# Patient Record
Sex: FEMALE | Race: WHITE | NOT HISPANIC OR LATINO | Employment: OTHER | URBAN - METROPOLITAN AREA
[De-identification: names, ages, dates, MRNs, and addresses within clinical notes are randomized per-mention and may not be internally consistent; named-entity substitution may affect disease eponyms.]

---

## 2024-10-15 ENCOUNTER — HOSPITAL ENCOUNTER (OUTPATIENT)
Facility: HOSPITAL | Age: 89
Setting detail: OBSERVATION
Discharge: HOSPICE | End: 2024-10-17
Attending: STUDENT IN AN ORGANIZED HEALTH CARE EDUCATION/TRAINING PROGRAM | Admitting: INTERNAL MEDICINE
Payer: MEDICARE

## 2024-10-15 ENCOUNTER — APPOINTMENT (OUTPATIENT)
Dept: RADIOLOGY | Facility: HOSPITAL | Age: 89
End: 2024-10-15
Payer: MEDICARE

## 2024-10-15 ENCOUNTER — APPOINTMENT (OUTPATIENT)
Dept: CARDIOLOGY | Facility: HOSPITAL | Age: 89
End: 2024-10-15
Payer: MEDICARE

## 2024-10-15 DIAGNOSIS — G93.41 ACUTE METABOLIC ENCEPHALOPATHY: ICD-10-CM

## 2024-10-15 DIAGNOSIS — E86.0 DEHYDRATION: ICD-10-CM

## 2024-10-15 DIAGNOSIS — R53.1 GENERALIZED WEAKNESS: Primary | ICD-10-CM

## 2024-10-15 DIAGNOSIS — G93.40 ENCEPHALOPATHY, UNSPECIFIED TYPE: ICD-10-CM

## 2024-10-15 DIAGNOSIS — M25.511 ACUTE PAIN OF RIGHT SHOULDER: ICD-10-CM

## 2024-10-15 DIAGNOSIS — N30.90 CYSTITIS: ICD-10-CM

## 2024-10-15 LAB
ALBUMIN SERPL BCP-MCNC: 4 G/DL (ref 3.4–5)
ALP SERPL-CCNC: 63 U/L (ref 33–136)
ALT SERPL W P-5'-P-CCNC: 20 U/L (ref 7–45)
ANION GAP SERPL CALC-SCNC: 15 MMOL/L (ref 10–20)
APPEARANCE UR: ABNORMAL
APTT PPP: 30 SECONDS (ref 27–38)
AST SERPL W P-5'-P-CCNC: 37 U/L (ref 9–39)
BACTERIA #/AREA URNS AUTO: ABNORMAL /HPF
BASOPHILS # BLD AUTO: 0.02 X10*3/UL (ref 0–0.1)
BASOPHILS NFR BLD AUTO: 0.3 %
BILIRUB SERPL-MCNC: 0.8 MG/DL (ref 0–1.2)
BILIRUB UR STRIP.AUTO-MCNC: NEGATIVE MG/DL
BUN SERPL-MCNC: 24 MG/DL (ref 6–23)
CALCIUM SERPL-MCNC: 8.8 MG/DL (ref 8.6–10.3)
CARDIAC TROPONIN I PNL SERPL HS: 14 NG/L (ref 0–13)
CHLORIDE SERPL-SCNC: 103 MMOL/L (ref 98–107)
CO2 SERPL-SCNC: 27 MMOL/L (ref 21–32)
COLOR UR: COLORLESS
CREAT SERPL-MCNC: 0.84 MG/DL (ref 0.5–1.05)
EGFRCR SERPLBLD CKD-EPI 2021: 65 ML/MIN/1.73M*2
EOSINOPHIL # BLD AUTO: 0.13 X10*3/UL (ref 0–0.4)
EOSINOPHIL NFR BLD AUTO: 1.8 %
ERYTHROCYTE [DISTWIDTH] IN BLOOD BY AUTOMATED COUNT: 13.4 % (ref 11.5–14.5)
FLUAV RNA RESP QL NAA+PROBE: NOT DETECTED
FLUBV RNA RESP QL NAA+PROBE: NOT DETECTED
GLUCOSE SERPL-MCNC: 95 MG/DL (ref 74–99)
GLUCOSE UR STRIP.AUTO-MCNC: NORMAL MG/DL
HCT VFR BLD AUTO: 38.7 % (ref 36–46)
HGB BLD-MCNC: 12.5 G/DL (ref 12–16)
IMM GRANULOCYTES # BLD AUTO: 0.02 X10*3/UL (ref 0–0.5)
IMM GRANULOCYTES NFR BLD AUTO: 0.3 % (ref 0–0.9)
INR PPP: 1 (ref 0.9–1.1)
KETONES UR STRIP.AUTO-MCNC: ABNORMAL MG/DL
LACTATE SERPL-SCNC: 0.9 MMOL/L (ref 0.4–2)
LEUKOCYTE ESTERASE UR QL STRIP.AUTO: ABNORMAL
LYMPHOCYTES # BLD AUTO: 1.1 X10*3/UL (ref 0.8–3)
LYMPHOCYTES NFR BLD AUTO: 15.4 %
MAGNESIUM SERPL-MCNC: 2.19 MG/DL (ref 1.6–2.4)
MCH RBC QN AUTO: 32.1 PG (ref 26–34)
MCHC RBC AUTO-ENTMCNC: 32.3 G/DL (ref 32–36)
MCV RBC AUTO: 99 FL (ref 80–100)
MONOCYTES # BLD AUTO: 0.62 X10*3/UL (ref 0.05–0.8)
MONOCYTES NFR BLD AUTO: 8.7 %
NEUTROPHILS # BLD AUTO: 5.25 X10*3/UL (ref 1.6–5.5)
NEUTROPHILS NFR BLD AUTO: 73.5 %
NITRITE UR QL STRIP.AUTO: ABNORMAL
NRBC BLD-RTO: 0 /100 WBCS (ref 0–0)
PH UR STRIP.AUTO: 8 [PH]
PLATELET # BLD AUTO: 194 X10*3/UL (ref 150–450)
POTASSIUM SERPL-SCNC: 4 MMOL/L (ref 3.5–5.3)
PROT SERPL-MCNC: 6.8 G/DL (ref 6.4–8.2)
PROT UR STRIP.AUTO-MCNC: ABNORMAL MG/DL
PROTHROMBIN TIME: 11 SECONDS (ref 9.8–12.8)
RBC # BLD AUTO: 3.9 X10*6/UL (ref 4–5.2)
RBC # UR STRIP.AUTO: NEGATIVE /UL
RBC #/AREA URNS AUTO: ABNORMAL /HPF
SARS-COV-2 RNA RESP QL NAA+PROBE: NOT DETECTED
SODIUM SERPL-SCNC: 141 MMOL/L (ref 136–145)
SP GR UR STRIP.AUTO: 1.02
TRI-PHOS CRY #/AREA UR COMP ASSIST: ABNORMAL /HPF
UROBILINOGEN UR STRIP.AUTO-MCNC: NORMAL MG/DL
WBC # BLD AUTO: 7.1 X10*3/UL (ref 4.4–11.3)
WBC #/AREA URNS AUTO: ABNORMAL /HPF

## 2024-10-15 PROCEDURE — 80053 COMPREHEN METABOLIC PANEL: CPT | Performed by: NURSE PRACTITIONER

## 2024-10-15 PROCEDURE — 72125 CT NECK SPINE W/O DYE: CPT | Performed by: STUDENT IN AN ORGANIZED HEALTH CARE EDUCATION/TRAINING PROGRAM

## 2024-10-15 PROCEDURE — 72128 CT CHEST SPINE W/O DYE: CPT | Performed by: STUDENT IN AN ORGANIZED HEALTH CARE EDUCATION/TRAINING PROGRAM

## 2024-10-15 PROCEDURE — 70450 CT HEAD/BRAIN W/O DYE: CPT | Performed by: STUDENT IN AN ORGANIZED HEALTH CARE EDUCATION/TRAINING PROGRAM

## 2024-10-15 PROCEDURE — 72131 CT LUMBAR SPINE W/O DYE: CPT | Performed by: STUDENT IN AN ORGANIZED HEALTH CARE EDUCATION/TRAINING PROGRAM

## 2024-10-15 PROCEDURE — 83605 ASSAY OF LACTIC ACID: CPT | Performed by: NURSE PRACTITIONER

## 2024-10-15 PROCEDURE — 36415 COLL VENOUS BLD VENIPUNCTURE: CPT | Performed by: NURSE PRACTITIONER

## 2024-10-15 PROCEDURE — 99285 EMERGENCY DEPT VISIT HI MDM: CPT | Mod: 25

## 2024-10-15 PROCEDURE — 93005 ELECTROCARDIOGRAM TRACING: CPT

## 2024-10-15 PROCEDURE — 72128 CT CHEST SPINE W/O DYE: CPT | Mod: RCN

## 2024-10-15 PROCEDURE — 87086 URINE CULTURE/COLONY COUNT: CPT | Mod: GEALAB | Performed by: NURSE PRACTITIONER

## 2024-10-15 PROCEDURE — 87636 SARSCOV2 & INF A&B AMP PRB: CPT | Performed by: NURSE PRACTITIONER

## 2024-10-15 PROCEDURE — 74177 CT ABD & PELVIS W/CONTRAST: CPT | Performed by: STUDENT IN AN ORGANIZED HEALTH CARE EDUCATION/TRAINING PROGRAM

## 2024-10-15 PROCEDURE — 71260 CT THORAX DX C+: CPT

## 2024-10-15 PROCEDURE — 84484 ASSAY OF TROPONIN QUANT: CPT | Performed by: NURSE PRACTITIONER

## 2024-10-15 PROCEDURE — 85610 PROTHROMBIN TIME: CPT | Performed by: NURSE PRACTITIONER

## 2024-10-15 PROCEDURE — 71260 CT THORAX DX C+: CPT | Performed by: STUDENT IN AN ORGANIZED HEALTH CARE EDUCATION/TRAINING PROGRAM

## 2024-10-15 PROCEDURE — 81001 URINALYSIS AUTO W/SCOPE: CPT | Performed by: NURSE PRACTITIONER

## 2024-10-15 PROCEDURE — 2550000001 HC RX 255 CONTRASTS: Performed by: STUDENT IN AN ORGANIZED HEALTH CARE EDUCATION/TRAINING PROGRAM

## 2024-10-15 PROCEDURE — 73030 X-RAY EXAM OF SHOULDER: CPT | Mod: RIGHT SIDE | Performed by: RADIOLOGY

## 2024-10-15 PROCEDURE — 85025 COMPLETE CBC W/AUTO DIFF WBC: CPT | Performed by: NURSE PRACTITIONER

## 2024-10-15 PROCEDURE — 72125 CT NECK SPINE W/O DYE: CPT

## 2024-10-15 PROCEDURE — 72131 CT LUMBAR SPINE W/O DYE: CPT | Mod: RCN

## 2024-10-15 PROCEDURE — 71045 X-RAY EXAM CHEST 1 VIEW: CPT

## 2024-10-15 PROCEDURE — 96361 HYDRATE IV INFUSION ADD-ON: CPT

## 2024-10-15 PROCEDURE — 70450 CT HEAD/BRAIN W/O DYE: CPT

## 2024-10-15 PROCEDURE — 85730 THROMBOPLASTIN TIME PARTIAL: CPT | Performed by: NURSE PRACTITIONER

## 2024-10-15 PROCEDURE — 2500000004 HC RX 250 GENERAL PHARMACY W/ HCPCS (ALT 636 FOR OP/ED): Performed by: NURSE PRACTITIONER

## 2024-10-15 PROCEDURE — 96365 THER/PROPH/DIAG IV INF INIT: CPT

## 2024-10-15 PROCEDURE — 83735 ASSAY OF MAGNESIUM: CPT | Performed by: NURSE PRACTITIONER

## 2024-10-15 PROCEDURE — 73030 X-RAY EXAM OF SHOULDER: CPT | Mod: RT

## 2024-10-15 PROCEDURE — 71045 X-RAY EXAM CHEST 1 VIEW: CPT | Mod: FOREIGN READ | Performed by: RADIOLOGY

## 2024-10-15 RX ORDER — CEFTRIAXONE 1 G/50ML
1 INJECTION, SOLUTION INTRAVENOUS ONCE
Status: COMPLETED | OUTPATIENT
Start: 2024-10-15 | End: 2024-10-15

## 2024-10-15 RX ADMIN — CEFTRIAXONE SODIUM 1 G: 1 INJECTION, SOLUTION INTRAVENOUS at 20:33

## 2024-10-15 RX ADMIN — SODIUM CHLORIDE 500 ML: 9 INJECTION, SOLUTION INTRAVENOUS at 18:54

## 2024-10-15 RX ADMIN — IOHEXOL 75 ML: 350 INJECTION, SOLUTION INTRAVENOUS at 19:48

## 2024-10-15 ASSESSMENT — PAIN SCALES - GENERAL: PAINLEVEL_OUTOF10: 0 - NO PAIN

## 2024-10-15 ASSESSMENT — PAIN - FUNCTIONAL ASSESSMENT: PAIN_FUNCTIONAL_ASSESSMENT: 0-10

## 2024-10-15 NOTE — ED PROVIDER NOTES
[facility]  [facility]  ED  Encounter Note  Admit Date/RoomTime: 10/15/2024  6:04 PM  [facility]  NAME: Melida Greene  : 1931  MRN: [id number]     Chief Complaint:  Urinary Frequency (Per EMS PT family sts she has UTI symptoms, less active last couple days. PT has HX dementia A&Ox0  baseline )    HISTORY OF PRESENT ILLNESS        Melida Greene is a 89 y.o. female who presents to the ED for evaluation of GENERALIZED WEAKNESS. Patient arrived via 911 per family for possible uti symptoms and not eating or acting appropriately. Family called regular doctor office who recommended ED evaluation.     Patient is alert to self.    Family who arrived shortly after arrival reported that pt was not acting appropriately on Friday they called 911 but vss were okay and they put her to bed. She did not eat that day.    On Saturday, she was somewhat better and seems stable. Today, seemed more altered. She does have diaper type rash due to her incontinence. She does not seem like her  baseline. She is alert to self per family. Mentation worse at night. No falls or trauma. She was weak enough to day that family had to assist with ambulation.     ROS   Pertinent positives and negatives are stated within HPI, all other systems reviewed and are negative.    Past Medical History:  has no past medical history on file.    Surgical History:  has no past surgical history on file.    Social History:   unclear if hx of smoking in the past alcohol or drug use    Family History: family history is not on file.     Allergies: Patient has no known allergies.    PHYSICAL EXAM   Oxygen Saturation Interpretation: Normal.     Physical Exam  Constitutional/General: Alert and oriented x0, well appearing, non toxic  HEENT:  NC/NT. PERRLA.  Airway patent.  Neck: Supple, full ROM. No midline vertebral tenderness or crepitus.   Respiratory: Lung sounds clear to auscultation bilaterally. No wheezes, rhonchi or stridor.  Not in respiratory distress.  CV:  Regular rate. Regular rhythm. No murmurs or rubs. 2+ distal pulses.  GI:  Abdomen soft, non-tender, non-distended. +BS. No rebound, guarding, or rigidity. No pulsatile masses.  Musculoskeletal: Moves all extremities x 4. Warm and well perfused. Capillary refill <3 seconds  Integument: Skin warm and dry. Diaper rash in perinum  Neurologic: Alert .  Psychiatric: anxious    Lab / Imaging Results   (All laboratory and radiology results have been personally reviewed by myself)  Labs:  Results for orders placed or performed during the hospital encounter of 10/15/24   CBC and Auto Differential   Result Value Ref Range    WBC 7.1 4.4 - 11.3 x10*3/uL    nRBC 0.0 0.0 - 0.0 /100 WBCs    RBC 3.90 (L) 4.00 - 5.20 x10*6/uL    Hemoglobin 12.5 12.0 - 16.0 g/dL    Hematocrit 38.7 36.0 - 46.0 %    MCV 99 80 - 100 fL    MCH 32.1 26.0 - 34.0 pg    MCHC 32.3 32.0 - 36.0 g/dL    RDW 13.4 11.5 - 14.5 %    Platelets 194 150 - 450 x10*3/uL    Neutrophils % 73.5 40.0 - 80.0 %    Immature Granulocytes %, Automated 0.3 0.0 - 0.9 %    Lymphocytes % 15.4 13.0 - 44.0 %    Monocytes % 8.7 2.0 - 10.0 %    Eosinophils % 1.8 0.0 - 6.0 %    Basophils % 0.3 0.0 - 2.0 %    Neutrophils Absolute 5.25 1.60 - 5.50 x10*3/uL    Immature Granulocytes Absolute, Automated 0.02 0.00 - 0.50 x10*3/uL    Lymphocytes Absolute 1.10 0.80 - 3.00 x10*3/uL    Monocytes Absolute 0.62 0.05 - 0.80 x10*3/uL    Eosinophils Absolute 0.13 0.00 - 0.40 x10*3/uL    Basophils Absolute 0.02 0.00 - 0.10 x10*3/uL   Magnesium   Result Value Ref Range    Magnesium 2.19 1.60 - 2.40 mg/dL   Comprehensive metabolic panel   Result Value Ref Range    Glucose 95 74 - 99 mg/dL    Sodium 141 136 - 145 mmol/L    Potassium 4.0 3.5 - 5.3 mmol/L    Chloride 103 98 - 107 mmol/L    Bicarbonate 27 21 - 32 mmol/L    Anion Gap 15 10 - 20 mmol/L    Urea Nitrogen 24 (H) 6 - 23 mg/dL    Creatinine 0.84 0.50 - 1.05 mg/dL    eGFR 65 >60 mL/min/1.73m*2    Calcium 8.8 8.6 -  10.3 mg/dL    Albumin 4.0 3.4 - 5.0 g/dL    Alkaline Phosphatase 63 33 - 136 U/L    Total Protein 6.8 6.4 - 8.2 g/dL    AST 37 9 - 39 U/L    Bilirubin, Total 0.8 0.0 - 1.2 mg/dL    ALT 20 7 - 45 U/L   Lactate   Result Value Ref Range    Lactate 0.9 0.4 - 2.0 mmol/L   Protime-INR   Result Value Ref Range    Protime 11.0 9.8 - 12.8 seconds    INR 1.0 0.9 - 1.1   aPTT   Result Value Ref Range    aPTT 30 27 - 38 seconds   Troponin I, High Sensitivity   Result Value Ref Range    Troponin I, High Sensitivity 14 (H) 0 - 13 ng/L   Sars-CoV-2 PCR   Result Value Ref Range    Coronavirus 2019, PCR Not Detected Not Detected   Influenza A, and B PCR   Result Value Ref Range    Flu A Result Not Detected Not Detected    Flu B Result Not Detected Not Detected   Urinalysis with Reflex Culture and Microscopic   Result Value Ref Range    Color, Urine Colorless (N) Light-Yellow, Yellow, Dark-Yellow    Appearance, Urine Turbid (N) Clear    Specific Gravity, Urine 1.017 1.005 - 1.035    pH, Urine 8.0 5.0, 5.5, 6.0, 6.5, 7.0, 7.5, 8.0    Protein, Urine 30 (1+) (A) NEGATIVE, 10 (TRACE), 20 (TRACE) mg/dL    Glucose, Urine Normal Normal mg/dL    Blood, Urine NEGATIVE NEGATIVE    Ketones, Urine TRACE (A) NEGATIVE mg/dL    Bilirubin, Urine NEGATIVE NEGATIVE    Urobilinogen, Urine Normal Normal mg/dL    Nitrite, Urine 1+ (A) NEGATIVE    Leukocyte Esterase, Urine 75 Tacos/µL (A) NEGATIVE   Microscopic Only, Urine   Result Value Ref Range    WBC, Urine 6-10 (A) 1-5, NONE /HPF    RBC, Urine 3-5 NONE, 1-2, 3-5 /HPF    Bacteria, Urine 1+ (A) NONE SEEN /HPF    Triple Phosphate Crystals, Urine 1+ NONE, 1+ /HPF     Imaging:  All Radiology results interpreted by Radiologist unless otherwise noted.  CT head wo IV contrast   Final Result   No acute intracranial abnormality was identified.        MACRO:   None        Signed by: Olesya Vu 10/15/2024 8:25 PM   Dictation workstation:   [id number]      CT cervical spine wo IV contrast   Final Result   1.  No acute fracture or traumatic malalignment of the cervical spine.   2. Moderate multilevel discogenic degenerative changes of cervical   spine as described above.        MACRO:   None        Signed by: Olesya Vu 10/15/2024 8:32 PM   Dictation workstation:   [id number]      CT thoracic spine wo IV contrast   Final Result   CHEST   1.  Suggestion of age-indeterminate subtle nondisplaced fracture   along the lateral aspect of the left 7th rib. Recommend correlation   with point tenderness at this location.   2. Otherwise no acute traumatic abnormality in the chest.   3. Mild subpleural mixed reticular and ground-glass opacities in the   dependent region of bilateral lower lobes, more pronounced on the   left compared to the right. This is most likely favored to represent   atelectasis. However pulmonary contusion can also be considered in   the differential, especially given history of trauma.        ABDOMEN - PELVIS   1.  No acute traumatic abnormality in the abdomen and pelvis.   2. Suggestion of mild nodular contour of the liver, this could be   related to diaphragmatic indentations. However very subtle or early   hepatic cirrhosis can also be considered in the differential in   appropriate clinical setting.   3. Incidental note of few scattered hepatic hypodense lesions (3 in   number) likely favored to represent cysts.   4. Hyperdense material within the dependent region of the   gallbladder, likely favored to represent vicarious excretion of   contrast. Layering stones/sludge can also be considered in the   differential.        THORACIC AND LUMBAR SPINE   1. Extensive osseous demineralization throughout the thoracic and   lumbar spine. No definite CT evidence of acute fracture or traumatic   malalignment.   2. Moderate lumbar spondylosis as described above.        MACRO:   None        Signed by: Olesya Vu 10/15/2024 10:06 PM   Dictation workstation:   [id number]      CT lumbar spine wo IV  contrast   Final Result   CHEST   1.  Suggestion of age-indeterminate subtle nondisplaced fracture   along the lateral aspect of the left 7th rib. Recommend correlation   with point tenderness at this location.   2. Otherwise no acute traumatic abnormality in the chest.   3. Mild subpleural mixed reticular and ground-glass opacities in the   dependent region of bilateral lower lobes, more pronounced on the   left compared to the right. This is most likely favored to represent   atelectasis. However pulmonary contusion can also be considered in   the differential, especially given history of trauma.        ABDOMEN - PELVIS   1.  No acute traumatic abnormality in the abdomen and pelvis.   2. Suggestion of mild nodular contour of the liver, this could be   related to diaphragmatic indentations. However very subtle or early   hepatic cirrhosis can also be considered in the differential in   appropriate clinical setting.   3. Incidental note of few scattered hepatic hypodense lesions (3 in   number) likely favored to represent cysts.   4. Hyperdense material within the dependent region of the   gallbladder, likely favored to represent vicarious excretion of   contrast. Layering stones/sludge can also be considered in the   differential.        THORACIC AND LUMBAR SPINE   1. Extensive osseous demineralization throughout the thoracic and   lumbar spine. No definite CT evidence of acute fracture or traumatic   malalignment.   2. Moderate lumbar spondylosis as described above.        MACRO:   None        Signed by: Olesya Vu 10/15/2024 10:06 PM   Dictation workstation:   [id number]      CT chest abdomen pelvis w IV contrast   Final Result   CHEST   1.  Suggestion of age-indeterminate subtle nondisplaced fracture   along the lateral aspect of the left 7th rib. Recommend correlation   with point tenderness at this location.   2. Otherwise no acute traumatic abnormality in the chest.   3. Mild subpleural mixed reticular  and ground-glass opacities in the   dependent region of bilateral lower lobes, more pronounced on the   left compared to the right. This is most likely favored to represent   atelectasis. However pulmonary contusion can also be considered in   the differential, especially given history of trauma.        ABDOMEN - PELVIS   1.  No acute traumatic abnormality in the abdomen and pelvis.   2. Suggestion of mild nodular contour of the liver, this could be   related to diaphragmatic indentations. However very subtle or early   hepatic cirrhosis can also be considered in the differential in   appropriate clinical setting.   3. Incidental note of few scattered hepatic hypodense lesions (3 in   number) likely favored to represent cysts.   4. Hyperdense material within the dependent region of the   gallbladder, likely favored to represent vicarious excretion of   contrast. Layering stones/sludge can also be considered in the   differential.        THORACIC AND LUMBAR SPINE   1. Extensive osseous demineralization throughout the thoracic and   lumbar spine. No definite CT evidence of acute fracture or traumatic   malalignment.   2. Moderate lumbar spondylosis as described above.        MACRO:   None        Signed by: Olesya Vu 10/15/2024 10:06 PM   Dictation workstation:   [id number]      XR chest 1 view   Final Result   No acute cardiopulmonary disease.   Signed by Ge Zuniga MD      XR shoulder right 2+ views   Final Result   No acute bony abnormalities.   Signed by Ge Zuniga MD          ED Course / Medical Decision Making     Medications   sodium chloride 0.9 % bolus 500 mL (0 mL intravenous Stopped 10/15/24 2007)   iohexol (OMNIPaque) 350 mg iodine/mL solution 75 mL (75 mL intravenous Given 10/15/24 1948)   cefTRIAXone (Rocephin) 1 g in dextrose (iso) IV 50 mL (0 g intravenous Stopped 10/15/24 2103)     ED Course as of 10/15/24 2333   Tue Oct 15, 2024   1831 EKG rate 86 pr interval 150 ms qrs duration 76 ms  qt qtc 378/452 ms prt axes 8 -26 24 normal rate rhythm and axis, personally reviewed by me [PK]      ED Course User Index  [PK] Natalia Erazo, MELI-CNP         Diagnoses as of 10/15/24 2333   Generalized weakness   Dehydration   Acute pain of right shoulder   Encephalopathy, unspecified type   Cystitis     Re-examination:    Patient’s condition fair    MDM:       Melida Greene is a 89 y.o. female who presents to the ED for evaluation of GENERALIZED WEAKNESS. Patient arrived via 911 per family for possible uti symptoms and not eating or acting appropriately. Family called regular doctor office who recommended ED evaluation.     Patient is alert to self.    Family who arrived shortly after arrival reported that pt was not acting appropriately on Friday they called 911 but vss were okay and they put her to bed. She did not eat that day.    On Saturday, she was somewhat better and seems stable. Today, seemed more altered. She does have diaper type rash due to her incontinence. She does not seem like her  baseline. She is alert to self per family. Mentation worse at night. No falls or trauma. She was weak enough to day that family had to assist with ambulation.    ED course stable  + strong urine smell and incontinence of urine.  + nitrate bacterial. Hx of frequent uti but not sure when last one.  Metabolic encephalopathy - altered sensorium since Friday.   No trauma, falls per family.  Has had appetite wax and wain since Friday.  No known fever or chills.  Ct scan head neck chest abd pelvis stable, no acute findings.   Received  ml x one and Rocephin1 gram ivp.  Vss stable.  Admitted to hospitalist  Ddx: UTI metabolic encephalopathy     Plan of Care/Counseling:  I reviewed today's visit with the patient and daughter and son in law in addition to providing specific details for the plan of care and counseling regarding the diagnosis and prognosis.  Questions are answered at this time and are agreeable with  the plan.    ASSESSMENT     1. Generalized weakness    2. Dehydration    3. Acute pain of right shoulder    4. Encephalopathy, unspecified type    5. Cystitis      PLAN   Admit to hospitalist     New Medications     New Medications Ordered This Visit   Medications    sodium chloride 0.9 % bolus 500 mL    iohexol (OMNIPaque) 350 mg iodine/mL solution 75 mL    cefTRIAXone (Rocephin) 1 g in dextrose (iso) IV 50 mL     Order Specific Question:   Suspected Indication (Select all that apply)     Answer:   Urinary Tract Infection     Order Specific Question:   Type of Therapy     Answer:   Empiric     Order Specific Question:   Type of Urinary Tract Infection     Answer:   Uncomplicated     Electronically signed by TY Eaton     **This report was transcribed using voice recognition software. Every effort was made to ensure accuracy; however, inadvertent computerized transcription errors may be present.  END OF ED PROVIDER NOTE     TY Eaton  10/15/24 3293

## 2024-10-16 LAB
ALBUMIN SERPL BCP-MCNC: 3.7 G/DL (ref 3.4–5)
ALP SERPL-CCNC: 60 U/L (ref 33–136)
ALT SERPL W P-5'-P-CCNC: 19 U/L (ref 7–45)
AMPHETAMINES UR QL SCN: NORMAL
ANION GAP SERPL CALC-SCNC: 12 MMOL/L (ref 10–20)
AST SERPL W P-5'-P-CCNC: 40 U/L (ref 9–39)
ATRIAL RATE: 86 BPM
BARBITURATES UR QL SCN: NORMAL
BENZODIAZ UR QL SCN: NORMAL
BILIRUB SERPL-MCNC: 0.6 MG/DL (ref 0–1.2)
BUN SERPL-MCNC: 23 MG/DL (ref 6–23)
BZE UR QL SCN: NORMAL
CALCIUM SERPL-MCNC: 8.8 MG/DL (ref 8.6–10.3)
CANNABINOIDS UR QL SCN: NORMAL
CHLORIDE SERPL-SCNC: 105 MMOL/L (ref 98–107)
CO2 SERPL-SCNC: 28 MMOL/L (ref 21–32)
CREAT SERPL-MCNC: 0.92 MG/DL (ref 0.5–1.05)
EGFRCR SERPLBLD CKD-EPI 2021: 59 ML/MIN/1.73M*2
ERYTHROCYTE [DISTWIDTH] IN BLOOD BY AUTOMATED COUNT: 13.2 % (ref 11.5–14.5)
FENTANYL+NORFENTANYL UR QL SCN: NORMAL
FOLATE SERPL-MCNC: >24 NG/ML
GLUCOSE SERPL-MCNC: 84 MG/DL (ref 74–99)
HCT VFR BLD AUTO: 37.1 % (ref 36–46)
HGB BLD-MCNC: 11.9 G/DL (ref 12–16)
HOLD SPECIMEN: NORMAL
MAGNESIUM SERPL-MCNC: 2.15 MG/DL (ref 1.6–2.4)
MCH RBC QN AUTO: 31.9 PG (ref 26–34)
MCHC RBC AUTO-ENTMCNC: 32.1 G/DL (ref 32–36)
MCV RBC AUTO: 100 FL (ref 80–100)
METHADONE UR QL SCN: NORMAL
NRBC BLD-RTO: 0 /100 WBCS (ref 0–0)
OPIATES UR QL SCN: NORMAL
OXYCODONE+OXYMORPHONE UR QL SCN: NORMAL
P AXIS: 8 DEGREES
P OFFSET: 186 MS
P ONSET: 140 MS
PCP UR QL SCN: NORMAL
PHOSPHATE SERPL-MCNC: 3.7 MG/DL (ref 2.5–4.9)
PLATELET # BLD AUTO: 182 X10*3/UL (ref 150–450)
POTASSIUM SERPL-SCNC: 4 MMOL/L (ref 3.5–5.3)
PR INTERVAL: 150 MS
PROT SERPL-MCNC: 6.6 G/DL (ref 6.4–8.2)
Q ONSET: 215 MS
QRS COUNT: 14 BEATS
QRS DURATION: 76 MS
QT INTERVAL: 378 MS
QTC CALCULATION(BAZETT): 452 MS
QTC FREDERICIA: 426 MS
R AXIS: -26 DEGREES
RBC # BLD AUTO: 3.73 X10*6/UL (ref 4–5.2)
SODIUM SERPL-SCNC: 141 MMOL/L (ref 136–145)
T AXIS: 24 DEGREES
T OFFSET: 404 MS
TSH SERPL-ACNC: 1.05 MIU/L (ref 0.44–3.98)
VENTRICULAR RATE: 86 BPM
VIT B12 SERPL-MCNC: 1040 PG/ML (ref 211–911)
WBC # BLD AUTO: 8.1 X10*3/UL (ref 4.4–11.3)

## 2024-10-16 PROCEDURE — 99223 1ST HOSP IP/OBS HIGH 75: CPT

## 2024-10-16 PROCEDURE — G0378 HOSPITAL OBSERVATION PER HR: HCPCS

## 2024-10-16 PROCEDURE — 2500000004 HC RX 250 GENERAL PHARMACY W/ HCPCS (ALT 636 FOR OP/ED)

## 2024-10-16 PROCEDURE — 94760 N-INVAS EAR/PLS OXIMETRY 1: CPT

## 2024-10-16 PROCEDURE — 82746 ASSAY OF FOLIC ACID SERUM: CPT | Mod: GEALAB

## 2024-10-16 PROCEDURE — 97161 PT EVAL LOW COMPLEX 20 MIN: CPT | Mod: GP

## 2024-10-16 PROCEDURE — 99222 1ST HOSP IP/OBS MODERATE 55: CPT

## 2024-10-16 PROCEDURE — 96366 THER/PROPH/DIAG IV INF ADDON: CPT

## 2024-10-16 PROCEDURE — 97535 SELF CARE MNGMENT TRAINING: CPT | Mod: GO

## 2024-10-16 PROCEDURE — 97165 OT EVAL LOW COMPLEX 30 MIN: CPT | Mod: GO

## 2024-10-16 PROCEDURE — 85027 COMPLETE CBC AUTOMATED: CPT

## 2024-10-16 PROCEDURE — 2500000002 HC RX 250 W HCPCS SELF ADMINISTERED DRUGS (ALT 637 FOR MEDICARE OP, ALT 636 FOR OP/ED): Mod: MUE

## 2024-10-16 PROCEDURE — 84075 ASSAY ALKALINE PHOSPHATASE: CPT

## 2024-10-16 PROCEDURE — 80307 DRUG TEST PRSMV CHEM ANLYZR: CPT

## 2024-10-16 PROCEDURE — 84443 ASSAY THYROID STIM HORMONE: CPT | Mod: GEALAB

## 2024-10-16 PROCEDURE — 99497 ADVNCD CARE PLAN 30 MIN: CPT

## 2024-10-16 PROCEDURE — 36415 COLL VENOUS BLD VENIPUNCTURE: CPT

## 2024-10-16 PROCEDURE — 84100 ASSAY OF PHOSPHORUS: CPT

## 2024-10-16 PROCEDURE — 96372 THER/PROPH/DIAG INJ SC/IM: CPT

## 2024-10-16 PROCEDURE — 82607 VITAMIN B-12: CPT | Mod: GEALAB

## 2024-10-16 PROCEDURE — 83735 ASSAY OF MAGNESIUM: CPT

## 2024-10-16 RX ORDER — CEFTRIAXONE 1 G/50ML
1 INJECTION, SOLUTION INTRAVENOUS EVERY 24 HOURS
Status: DISCONTINUED | OUTPATIENT
Start: 2024-10-16 | End: 2024-10-17 | Stop reason: HOSPADM

## 2024-10-16 RX ORDER — HALOPERIDOL 5 MG/ML
5 INJECTION INTRAMUSCULAR EVERY 6 HOURS PRN
Status: DISCONTINUED | OUTPATIENT
Start: 2024-10-16 | End: 2024-10-17 | Stop reason: HOSPADM

## 2024-10-16 RX ORDER — POLYETHYLENE GLYCOL 3350 17 G/17G
17 POWDER, FOR SOLUTION ORAL DAILY
Status: DISCONTINUED | OUTPATIENT
Start: 2024-10-16 | End: 2024-10-17 | Stop reason: HOSPADM

## 2024-10-16 RX ORDER — QUETIAPINE FUMARATE 25 MG/1
25 TABLET, FILM COATED ORAL NIGHTLY PRN
Status: DISCONTINUED | OUTPATIENT
Start: 2024-10-16 | End: 2024-10-17 | Stop reason: HOSPADM

## 2024-10-16 RX ORDER — ENOXAPARIN SODIUM 100 MG/ML
30 INJECTION SUBCUTANEOUS EVERY 24 HOURS
Status: DISCONTINUED | OUTPATIENT
Start: 2024-10-16 | End: 2024-10-17 | Stop reason: HOSPADM

## 2024-10-16 RX ADMIN — ENOXAPARIN SODIUM 30 MG: 30 INJECTION SUBCUTANEOUS at 08:38

## 2024-10-16 RX ADMIN — CEFTRIAXONE SODIUM 1 G: 1 INJECTION, SOLUTION INTRAVENOUS at 20:14

## 2024-10-16 RX ADMIN — QUETIAPINE FUMARATE 25 MG: 25 TABLET ORAL at 03:52

## 2024-10-16 SDOH — SOCIAL STABILITY: SOCIAL INSECURITY
WITHIN THE LAST YEAR, HAVE YOU BEEN KICKED, HIT, SLAPPED, OR OTHERWISE PHYSICALLY HURT BY YOUR PARTNER OR EX-PARTNER?: PATIENT UNABLE TO ANSWER

## 2024-10-16 SDOH — SOCIAL STABILITY: SOCIAL INSECURITY: WERE YOU ABLE TO COMPLETE ALL THE BEHAVIORAL HEALTH SCREENINGS?: YES

## 2024-10-16 SDOH — SOCIAL STABILITY: SOCIAL INSECURITY: WITHIN THE LAST YEAR, HAVE YOU BEEN AFRAID OF YOUR PARTNER OR EX-PARTNER?: PATIENT UNABLE TO ANSWER

## 2024-10-16 SDOH — SOCIAL STABILITY: SOCIAL INSECURITY: HAVE YOU HAD THOUGHTS OF HARMING ANYONE ELSE?: NO

## 2024-10-16 SDOH — SOCIAL STABILITY: SOCIAL INSECURITY: DOES ANYONE TRY TO KEEP YOU FROM HAVING/CONTACTING OTHER FRIENDS OR DOING THINGS OUTSIDE YOUR HOME?: NO

## 2024-10-16 SDOH — SOCIAL STABILITY: SOCIAL INSECURITY
WITHIN THE LAST YEAR, HAVE YOU BEEN HUMILIATED OR EMOTIONALLY ABUSED IN OTHER WAYS BY YOUR PARTNER OR EX-PARTNER?: PATIENT UNABLE TO ANSWER

## 2024-10-16 SDOH — ECONOMIC STABILITY: INCOME INSECURITY
IN THE PAST 12 MONTHS HAS THE ELECTRIC, GAS, OIL, OR WATER COMPANY THREATENED TO SHUT OFF SERVICES IN YOUR HOME?: PATIENT UNABLE TO ANSWER

## 2024-10-16 SDOH — SOCIAL STABILITY: SOCIAL INSECURITY: DO YOU FEEL UNSAFE GOING BACK TO THE PLACE WHERE YOU ARE LIVING?: NO

## 2024-10-16 SDOH — ECONOMIC STABILITY: HOUSING INSECURITY
IN THE LAST 12 MONTHS, WAS THERE A TIME WHEN YOU WERE NOT ABLE TO PAY THE MORTGAGE OR RENT ON TIME?: PATIENT UNABLE TO ANSWER

## 2024-10-16 SDOH — ECONOMIC STABILITY: FOOD INSECURITY
WITHIN THE PAST 12 MONTHS, THE FOOD YOU BOUGHT JUST DIDN'T LAST AND YOU DIDN'T HAVE MONEY TO GET MORE.: PATIENT UNABLE TO ANSWER

## 2024-10-16 SDOH — ECONOMIC STABILITY: FOOD INSECURITY
WITHIN THE PAST 12 MONTHS, YOU WORRIED THAT YOUR FOOD WOULD RUN OUT BEFORE YOU GOT THE MONEY TO BUY MORE.: PATIENT UNABLE TO ANSWER

## 2024-10-16 SDOH — ECONOMIC STABILITY: HOUSING INSECURITY: IN THE PAST 12 MONTHS, HOW MANY TIMES HAVE YOU MOVED WHERE YOU WERE LIVING?: 0

## 2024-10-16 SDOH — ECONOMIC STABILITY: HOUSING INSECURITY: AT ANY TIME IN THE PAST 12 MONTHS, WERE YOU HOMELESS OR LIVING IN A SHELTER (INCLUDING NOW)?: PATIENT UNABLE TO ANSWER

## 2024-10-16 SDOH — ECONOMIC STABILITY: FOOD INSECURITY
HOW HARD IS IT FOR YOU TO PAY FOR THE VERY BASICS LIKE FOOD, HOUSING, MEDICAL CARE, AND HEATING?: PATIENT UNABLE TO ANSWER

## 2024-10-16 SDOH — ECONOMIC STABILITY: TRANSPORTATION INSECURITY
IN THE PAST 12 MONTHS, HAS LACK OF TRANSPORTATION KEPT YOU FROM MEDICAL APPOINTMENTS OR FROM GETTING MEDICATIONS?: PATIENT UNABLE TO ANSWER

## 2024-10-16 SDOH — SOCIAL STABILITY: SOCIAL INSECURITY: DO YOU FEEL ANYONE HAS EXPLOITED OR TAKEN ADVANTAGE OF YOU FINANCIALLY OR OF YOUR PERSONAL PROPERTY?: NO

## 2024-10-16 SDOH — SOCIAL STABILITY: SOCIAL INSECURITY
WITHIN THE LAST YEAR, HAVE YOU BEEN RAPED OR FORCED TO HAVE ANY KIND OF SEXUAL ACTIVITY BY YOUR PARTNER OR EX-PARTNER?: PATIENT UNABLE TO ANSWER

## 2024-10-16 SDOH — SOCIAL STABILITY: SOCIAL INSECURITY: ARE THERE ANY APPARENT SIGNS OF INJURIES/BEHAVIORS THAT COULD BE RELATED TO ABUSE/NEGLECT?: NO

## 2024-10-16 SDOH — SOCIAL STABILITY: SOCIAL INSECURITY: HAS ANYONE EVER THREATENED TO HURT YOUR FAMILY OR YOUR PETS?: NO

## 2024-10-16 SDOH — SOCIAL STABILITY: SOCIAL INSECURITY: ABUSE: ADULT

## 2024-10-16 SDOH — SOCIAL STABILITY: SOCIAL INSECURITY: HAVE YOU HAD ANY THOUGHTS OF HARMING ANYONE ELSE?: NO

## 2024-10-16 SDOH — SOCIAL STABILITY: SOCIAL INSECURITY: ARE YOU OR HAVE YOU BEEN THREATENED OR ABUSED PHYSICALLY, EMOTIONALLY, OR SEXUALLY BY ANYONE?: NO

## 2024-10-16 ASSESSMENT — PAIN SCALES - PAIN ASSESSMENT IN ADVANCED DEMENTIA (PAINAD)
BREATHING: NORMAL
CONSOLABILITY: DISTRACTED OR REASSURED BY VOICE/TOUCH
BREATHING: NORMAL
BODYLANGUAGE: TENSE, DISTRESSED PACING, FIDGETING
TOTALSCORE: 1
TOTALSCORE: 3
FACIALEXPRESSION: SMILING OR INEXPRESSIVE
NEGVOCALIZATION: OCCASIONAL MOAN/GROAN, LOW SPEECH, NEGATIVE/DISAPPROVING QUALITY
FACIALEXPRESSION: SMILING OR INEXPRESSIVE
BODYLANGUAGE: TENSE, DISTRESSED PACING, FIDGETING
TOTALSCORE: REPOSITIONED;THERAPEUTIC TOUCH;THERAPEUTIC PRESENCE
CONSOLABILITY: NO NEED TO CONSOLE

## 2024-10-16 ASSESSMENT — COGNITIVE AND FUNCTIONAL STATUS - GENERAL
MOBILITY SCORE: 16
MOVING FROM LYING ON BACK TO SITTING ON SIDE OF FLAT BED WITH BEDRAILS: A LITTLE
TURNING FROM BACK TO SIDE WHILE IN FLAT BAD: A LITTLE
DRESSING REGULAR UPPER BODY CLOTHING: A LOT
MOVING TO AND FROM BED TO CHAIR: A LOT
STANDING UP FROM CHAIR USING ARMS: A LITTLE
MOVING TO AND FROM BED TO CHAIR: A LITTLE
CLIMB 3 TO 5 STEPS WITH RAILING: A LOT
DAILY ACTIVITIY SCORE: 12
DAILY ACTIVITIY SCORE: 20
CLIMB 3 TO 5 STEPS WITH RAILING: TOTAL
HELP NEEDED FOR BATHING: A LITTLE
STANDING UP FROM CHAIR USING ARMS: A LOT
PERSONAL GROOMING: A LOT
TURNING FROM BACK TO SIDE WHILE IN FLAT BAD: A LITTLE
WALKING IN HOSPITAL ROOM: A LOT
HELP NEEDED FOR BATHING: A LOT
TOILETING: A LOT
DRESSING REGULAR UPPER BODY CLOTHING: A LITTLE
DRESSING REGULAR LOWER BODY CLOTHING: A LOT
MOBILITY SCORE: 14
TOILETING: A LITTLE
WALKING IN HOSPITAL ROOM: A LITTLE
DRESSING REGULAR LOWER BODY CLOTHING: A LITTLE
PATIENT BASELINE BEDBOUND: NO
MOVING FROM LYING ON BACK TO SITTING ON SIDE OF FLAT BED WITH BEDRAILS: A LITTLE
EATING MEALS: A LOT

## 2024-10-16 ASSESSMENT — PATIENT HEALTH QUESTIONNAIRE - PHQ9
1. LITTLE INTEREST OR PLEASURE IN DOING THINGS: NOT AT ALL
SUM OF ALL RESPONSES TO PHQ9 QUESTIONS 1 & 2: 0
2. FEELING DOWN, DEPRESSED OR HOPELESS: NOT AT ALL

## 2024-10-16 ASSESSMENT — ENCOUNTER SYMPTOMS
DIARRHEA: 0
VOMITING: 0
NAUSEA: 0
ACTIVITY CHANGE: 1
WHEEZING: 0
APPETITE CHANGE: 1
FREQUENCY: 1
SHORTNESS OF BREATH: 0

## 2024-10-16 ASSESSMENT — LIFESTYLE VARIABLES
HOW OFTEN DO YOU HAVE A DRINK CONTAINING ALCOHOL: NEVER
HOW OFTEN DO YOU HAVE 6 OR MORE DRINKS ON ONE OCCASION: NEVER
AUDIT-C TOTAL SCORE: 0
SKIP TO QUESTIONS 9-10: 1
AUDIT-C TOTAL SCORE: 0
HOW MANY STANDARD DRINKS CONTAINING ALCOHOL DO YOU HAVE ON A TYPICAL DAY: PATIENT DOES NOT DRINK

## 2024-10-16 ASSESSMENT — ACTIVITIES OF DAILY LIVING (ADL)
HEARING - RIGHT EAR: FUNCTIONAL
PATIENT'S MEMORY ADEQUATE TO SAFELY COMPLETE DAILY ACTIVITIES?: UNABLE TO ASSESS
HEARING - LEFT EAR: FUNCTIONAL
BATHING: NEEDS ASSISTANCE
HOME_MANAGEMENT_TIME_ENTRY: 20
LACK_OF_TRANSPORTATION: PATIENT UNABLE TO ANSWER
WALKS IN HOME: NEEDS ASSISTANCE
JUDGMENT_ADEQUATE_SAFELY_COMPLETE_DAILY_ACTIVITIES: UNABLE TO ASSESS
DRESSING YOURSELF: NEEDS ASSISTANCE
GROOMING: NEEDS ASSISTANCE
BATHING_ASSISTANCE: MAXIMAL
FEEDING YOURSELF: INDEPENDENT
ADL_ASSISTANCE: NEEDS ASSISTANCE
ADEQUATE_TO_COMPLETE_ADL: UNABLE TO ASSESS
TOILETING: NEEDS ASSISTANCE

## 2024-10-16 ASSESSMENT — PAIN - FUNCTIONAL ASSESSMENT
PAIN_FUNCTIONAL_ASSESSMENT: 0-10
PAIN_FUNCTIONAL_ASSESSMENT: 0-10
PAIN_FUNCTIONAL_ASSESSMENT: PAINAD (PAIN ASSESSMENT IN ADVANCED DEMENTIA SCALE)

## 2024-10-16 ASSESSMENT — PAIN SCALES - GENERAL
PAINLEVEL_OUTOF10: 0 - NO PAIN
PAINLEVEL_OUTOF10: 0 - NO PAIN

## 2024-10-16 ASSESSMENT — PAIN SCALES - WONG BAKER: WONGBAKER_NUMERICALRESPONSE: HURTS LITTLE MORE

## 2024-10-16 NOTE — PROGRESS NOTES
Occupational Therapy    Evaluation    Patient Name: Melida Greene  MRN: [id number]  Department: [address]  Room: 222222-A  Today's Date: 10/16/2024  Time Calculation  Start Time: 1400  Stop Time: 1430  Time Calculation (min): 30 min    Assessment  IP OT Assessment  OT Assessment: Pt presents with impaired ADL and functional mobility, although participation and tolerance are significantly impacted by her impaired cognition. Pt requires assistance and supervision to safely participate in all ADL and mobility. No skilled OT needs identified in this setting.  Barriers to Discharge: None  Evaluation/Treatment Tolerance: Treatment limited secondary to medical complications (Comment) (impaired cognition)  Medical Staff Made Aware: Yes  End of Session Communication: Bedside nurse, PCT/NA/CTA  End of Session Patient Position: Bed, 3 rail up, Alarm on  Plan:  No Skilled OT: No acute OT goals identified  OT Discharge Recommendations: 24 hr supervision due to cognition, No further acute OT  Equipment Recommended upon Discharge: Wheeled walker, Wheelchair (family owns)  OT Recommended Transfer Status: Assist of 1  OT - OK to Discharge: Yes (per OT POC)    Subjective     General:  General  Reason for Referral: 89 yo female admitted with generalized weakness, progressive weakness and functional decline over the last 6 months. Referred to OT for impaired ADL and safety assessment  Past Medical History Relevant to Rehab: Severe dementia with psychotic features  Family/Caregiver Present: No  Co-Treatment: PT  Co-Treatment Reason: to maximize pt outcomes  Prior to Session Communication: Bedside nurse  Patient Position Received: Bed, 3 rail up, Alarm on  General Comment: Pleasantly confused, responds accurately to < 50% commands, physical assistance required to complete desired tasks  Precautions:  Medical Precautions: Fall precautions (purewick)    Pain:  Pain Assessment  Rios-Pulido FACES Pain Rating: Hurts little more  Pain  "Type:  (NV pain response with some portions of mobility, however none verbalized)  Pain Interventions: Repositioned, Ambulation/increased activity, Food, Therapeutic touch  Response to Interventions: Positive response, able to participate in mobility as instructed and tolerated eating about 25% of meal prior to falling asleep    Objective   Cognition:  Overall Cognitive Status: Impaired at baseline  Orientation Level: Disoriented to place, Disoriented to time, Disoriented to situation (Responds to name, however does not provide verbal response to orientation questions)  Following Commands:  (Follows one step commands with physical prompts/directives and responds seemingly accurately with yes/no questions)           Home Living:  Lives With: Adult children  Home Living Comments: Pt unable to provide home set-up information due to impaired cognition, per EMR there are no concerns   Prior Function:  Receives Help From: Family  ADL Assistance: Needs assistance  Homemaking Assistance: Needs assistance  Ambulatory Assistance: Independent (ambulates within her home independently, intermittently requiring cane vs FWW vs WC depending on pt cognition and distance of ambulation)  Prior Function Comments: PLOF information obtained from EMR and TCC. Family is with pt at all times and provides care as needed    ADL:  Eating Assistance: Maximal  Eating Deficit: Setup, Increased time to complete, Bringing food to mouth assist (Soft food provided and other foods cut into small pieces. Pt unable to demo manipulation of utensils and hand to mouth stating \"That feels too hard\". Assistance provided, completing about 25% of meal)  Grooming Assistance: Maximal  Grooming Deficit: Setup, Verbal cueing, Supervision/safety, Increased time to complete, Standing with assistive device  Bathing Assistance: Maximal  Bathing Deficit: Setup, Verbal cueing, Supervision/safety, Increased time to complete   UE Dressing Assistance: Maximal  UE Dressing " Deficit: Setup, Verbal cueing, Supervision/safety, Thread RUE, Thread LUE, Pull over head, Pull around back  LE Dressing Assistance: Maximal  LE Dressing Deficit: Setup, Verbal cueing, Supervision/safety, Increased time to complete, Don/doff R sock, Don/doff L sock, Thread RLE into pants, Thread LLE into pants, Pull up over hips  Toileting Assistance with Device: Maximal  Toileting Deficit: Setup, Supervison/safety, Increased time to complete, Perineal hygiene, Incontinent  ADL Comments: Requires assistance due to impaired cognition, anticipate fluctation of level of assist required depending on the cognitive state at time of task presented  Activity Tolerance:  Endurance: Tolerates less than 10 min exercise, no significant change in vital signs  Bed Mobility/Transfers: Bed Mobility  Bed Mobility: Yes  Bed Mobility 1  Bed Mobility 1: Supine to sitting, Sitting to supine  Level of Assistance 1: Minimum assistance  Bed Mobility Comments 1: HOB elevated, HHA and prompts required to initiate and carryout movement to EOB    Transfers  Transfer: Yes  Transfer 1  Transfer From 1: Sit to, Stand to  Transfer to 1: Sit, Stand  Technique 1: Sit to stand, Stand to sit  Transfer Device 1: Walker  Transfer Level of Assistance 1: Minimum assistance  Trials/Comments 1: visual/tactile cues      Functional Mobility:  Functional Mobility  Functional Mobility Performed: Yes  Functional Mobility 1  Surface 1: Level tile  Device 1: Rolling walker  Assistance 1: Minimum assistance, Maximum tactile cues  Comments 1: Positioning of hands on FWW and assistance for management of device as a directional cue to continue with functional mobility demonstration. Pt ambulates around the foot of the bed, about 10-12'    Outcome Measures: [facility] Daily Activity  Putting on and taking off regular lower body clothing: A lot  Bathing (including washing, rinsing, drying): A lot  Putting on and taking off regular upper body clothing: A lot  Toileting, which  includes using toilet, bedpan or urinal: A lot  Taking care of personal grooming such as brushing teeth: A lot  Eating Meals: A lot  Daily Activity - Total Score: 12

## 2024-10-16 NOTE — PROGRESS NOTES
Physical Therapy    Physical Therapy Evaluation    Patient Name: Melida Greene  MRN: [id number]  Department: [address]  Room: 222/222-A  Today's Date: 10/16/2024   Time Calculation  Start Time: 1359  Stop Time: 1416  Time Calculation (min): 17 min    Assessment/Plan   PT Assessment  PT Assessment Results: Decreased mobility, Decreased strength, Decreased endurance, Impaired balance, Decreased cognition, Impaired judgement, Decreased safety awareness  Rehab Prognosis: Poor  Barriers to Discharge: Cognitive deficits  Evaluation/Treatment Tolerance: Patient tolerated treatment well  Medical Staff Made Aware: Yes  End of Session Communication: Bedside nurse  End of Session Patient Position: Bed, 3 rail up, Alarm on (call bell on lap)    Assessment:   Pt is a 89 yo female with PMH severe dementia who presents for PT eval this date. Pt is A&O x0-1, which is reportedly her baseline. Pt completed all functional mobility with Shayy and FWW, requiring max verbal/visual/tactile cues to follow instructions and assist to manage device. Pt appears to be functioning near her baseline. No acute PT needs at this time; will discontinue order. Recommend 24/7 supervision from family at discharge to maximize pt safety.       IP OR SWING BED PT PLAN  Inpatient or Swing Bed: Inpatient  PT Plan  PT Plan: PT Eval only  PT Eval Only Reason: At baseline function  PT Frequency: PT eval only  PT Discharge Recommendations: No further acute PT, No PT needed after discharge, 24 hr supervision due to cognition  Equipment Recommended upon Discharge: Wheeled walker, Wheelchair (family owns both)  PT Recommended Transfer Status: Assist x1  PT - OK to Discharge: Yes (per PT POC)    Subjective   General Visit Information:  General  Reason for Referral: Pt is a 89 yo female who was brought in by family on 10/15/24 with generalized weakness. Family reported pt has been increasingly immobile since Saturday. Pt has dementia with 6 month history of  worsening sx. PT consulted for decreased safety awareness and impaired mobility.  Past Medical History Relevant to Rehab: Severe dementia with psychotic features  Family/Caregiver Present: No  Co-Treatment: OT  Co-Treatment Reason: To maximize pt safety, participation, and functional outcomes 2/2 PMH of severe dementia (A&O x0 at baseline)  Prior to Session Communication: Bedside nurse  Patient Position Received: Bed, 3 rail up, Alarm on  General Comment: Pt agreeable to therapy evals with encouragement.  Home Living:  Home Living  Home Living Comments: Unable to obtain details about home setup 2/2 cognitive deficits  Prior Level of Function:  Prior Function Per Pt/Caregiver Report  Prior Function Comments: Per EMR/TCC, pt ambulates independently but does have episodes of increased weakness and uses a walker, cane, or WC.  Precautions:  Precautions  Medical Precautions: Fall precautions  Precautions Comment: Severe dementia (A&O x0 at baseline)     Vital Signs (Past 2hrs)        Date/Time Vitals Session Patient Position Pulse Resp SpO2 BP MAP (mmHg)    10/16/24 1420 --  --  86  18  96 %  164/78  --                         Objective   Pain:  Pain Assessment  Pain Assessment: 0-10  0-10 (Numeric) Pain Score: 0 - No pain  Pain Interventions: Repositioned, Ambulation/increased activity  Response to Interventions: Remained pain-free during session  Cognition:  Cognition  Overall Cognitive Status: Impaired at baseline  Orientation Level: Disoriented to place, Disoriented to time, Disoriented to situation    General Assessments:  General Observation  General Observation: Frail appearance               Activity Tolerance  Endurance: Tolerates less than 10 min exercise, no significant change in vital signs    Strength  Strength Comments: BLE grossly 2+/5  Static Sitting Balance  Static Sitting-Balance Support: Feet supported, Bilateral upper extremity supported  Static Sitting-Level of Assistance: Contact guard    Static  Standing Balance  Static Standing-Balance Support: Bilateral upper extremity supported  Static Standing-Level of Assistance: Minimum assistance  Static Standing-Comment/Number of Minutes: with FWW  Functional Assessments:  Bed Mobility  Bed Mobility: Yes  Bed Mobility 1  Bed Mobility 1: Supine to sitting  Level of Assistance 1: Minimum assistance  Bed Mobility Comments 1: HOB elevated, increased time to complete  Bed Mobility 2  Bed Mobility  2: Sitting to supine  Level of Assistance 2: Minimum assistance    Transfers  Transfer: Yes  Transfer 1  Technique 1: Sit to stand, Stand to sit  Transfer Device 1: Walker  Transfer Level of Assistance 1: Minimum assistance  Trials/Comments 1: EOB; visual/tactile cues for safe use of ww    Ambulation/Gait Training  Ambulation/Gait Training Performed: Yes  Ambulation/Gait Training 1  Surface 1: Level tile  Device 1: Rolling walker  Assistance 1: Minimum assistance  Quality of Gait 1: Narrow base of support, Decreased step length, Shuffling gait, Forward flexed posture, Soft knee(s)  Comments/Distance (ft) 1: 15' around bed; assist to manage FWW    Outcome Measures:  [facility] Basic Mobility  Turning from your back to your side while in a flat bed without using bedrails: A little  Moving from lying on your back to sitting on the side of a flat bed without using bedrails: A little  Moving to and from bed to chair (including a wheelchair): A little  Standing up from a chair using your arms (e.g. wheelchair or bedside chair): A little  To walk in hospital room: A little  Climbing 3-5 steps with railing: Total  Basic Mobility - Total Score: 16    Education Documentation  Body Mechanics, taught by Michelle Verdugo PT at 10/16/2024  3:29 PM.  Learner: Patient  Readiness: Acceptance  Method: Explanation, Demonstration  Response: No Evidence of Learning    Mobility Training, taught by Michelle Verdugo, PT at 10/16/2024  3:29 PM.  Learner: Patient  Readiness: Acceptance  Method:  Explanation, Demonstration  Response: No Evidence of Learning

## 2024-10-16 NOTE — H&P
Internal Medicine - History and Physical Note      Patient: Melida Greene, Age: 89 y.o., SEX: female , MRN:[id number], ROOM:222/[facility],  Code: DNR and No Intubation   Admitted On: 10/15/2024   Admitting Dx: Dehydration [E86.0]  Cystitis [N30.90]  Generalized weakness [R53.1]  Acute pain of right shoulder [M25.511]  Encephalopathy, unspecified type [G93.40]  Acute metabolic encephalopathy [G93.41]  PCP: Judit Del Castillo MD        Attending: Frandy Bowman DO        Chief Complaint   Patient: Melida Greene is a 89 y.o. female who presented to the hospital for   Chief Complaint   Patient presents with    Urinary Frequency     Per EMS PT family sts she has UTI symptoms, less active last couple days. PT has HX dementia A&Ox0  baseline        HPI   Melida Greene is a 89 y.o. female with pmh of dementia, unspecified type, presented to the hospital for altered mental status and increased lethargy. Patient's daughter was contacted by phone and assisted with evaluation. According to daughter, patient does not have significant medical history aside from dementia and is only on vitamin supplements at home. According to patient's family, patient had been increasingly immobile since Saturday. She did not wake up at her usual time and did not wish to leave her bed throughout the whole day. Patient is incontinent at baseline, but showed increased urinary frequency. The patient showed some improvement the following day, however her symptoms returned Monday morning. According to patient's daughter, the patient is disoriented at baseline. She frequently converses with inanimate objects in her room and is Alert and oriented x 0. On evaluation patient demonstrated frequent tangential thoughts and did not answer any questions concerning her current symptoms. She did not demonstrate slurring of speech or facial droop. She is not on any home medication except vitamin supplements. Of note, patient's daughter did warn  "physician that the patient had left a prior hospitalization in 2022 without warning.     In ED, patient was hypertensive with /83  and borderline tachycardic with HR 98. CT imaging did not indicate acute intracranial process. CT chest indicated signs of atelectasis. Otherwise no significant evidence of acute findings. She was found to have UA with LE 75, 6-10 WBC and received 1 dose of Rocephin and 500mL NS bolus.    12 point Review of Systems negative unless stated above.     ROS  Review of Systems   Reason unable to perform ROS: Patient was disoriented and not responding well to questions. ROS received from patient's daughter.   Constitutional:  Positive for activity change and appetite change.   Respiratory:  Negative for shortness of breath and wheezing.    Gastrointestinal:  Negative for diarrhea, nausea and vomiting.   Genitourinary:  Positive for frequency.        12 Point ROS negative unless otherwise specified above.     ED COURSE:   Vitals - /80 (BP Location: Right arm, Patient Position: Lying)   Pulse 104   Temp 36.1 °C (97 °F) (Temporal)   Resp 22   Wt 47.7 kg (105 lb 3.2 oz)   SpO2 99%   Labs -   Lab Results   Component Value Date    WBC 7.1 10/15/2024    HGB 12.5 10/15/2024    HCT 38.7 10/15/2024    MCV 99 10/15/2024     10/15/2024     Lab Results   Component Value Date    GLUCOSE 95 10/15/2024    CALCIUM 8.8 10/15/2024     10/15/2024    K 4.0 10/15/2024    CO2 27 10/15/2024     10/15/2024    BUN 24 (H) 10/15/2024    CREATININE 0.84 10/15/2024     Lab Results   Component Value Date    TROPHS 14 (H) 10/15/2024   No results found for: \"BNP\"No results found for: \"DDIMERVTE\"  Imaging -   CT head wo IV contrast   Final Result   No acute intracranial abnormality was identified.        MACRO:   None        Signed by: Olesya Vu 10/15/2024 8:25 PM   Dictation workstation:   [id number]      CT cervical spine wo IV contrast   Final Result   1. No acute fracture or " traumatic malalignment of the cervical spine.   2. Moderate multilevel discogenic degenerative changes of cervical   spine as described above.        MACRO:   None        Signed by: Olesya Vu 10/15/2024 8:32 PM   Dictation workstation:   [id number]      CT thoracic spine wo IV contrast   Final Result   CHEST   1.  Suggestion of age-indeterminate subtle nondisplaced fracture   along the lateral aspect of the left 7th rib. Recommend correlation   with point tenderness at this location.   2. Otherwise no acute traumatic abnormality in the chest.   3. Mild subpleural mixed reticular and ground-glass opacities in the   dependent region of bilateral lower lobes, more pronounced on the   left compared to the right. This is most likely favored to represent   atelectasis. However pulmonary contusion can also be considered in   the differential, especially given history of trauma.        ABDOMEN - PELVIS   1.  No acute traumatic abnormality in the abdomen and pelvis.   2. Suggestion of mild nodular contour of the liver, this could be   related to diaphragmatic indentations. However very subtle or early   hepatic cirrhosis can also be considered in the differential in   appropriate clinical setting.   3. Incidental note of few scattered hepatic hypodense lesions (3 in   number) likely favored to represent cysts.   4. Hyperdense material within the dependent region of the   gallbladder, likely favored to represent vicarious excretion of   contrast. Layering stones/sludge can also be considered in the   differential.        THORACIC AND LUMBAR SPINE   1. Extensive osseous demineralization throughout the thoracic and   lumbar spine. No definite CT evidence of acute fracture or traumatic   malalignment.   2. Moderate lumbar spondylosis as described above.        MACRO:   None        Signed by: Olesya Vu 10/15/2024 10:06 PM   Dictation workstation:   [id number]      CT lumbar spine wo IV contrast   Final Result    CHEST   1.  Suggestion of age-indeterminate subtle nondisplaced fracture   along the lateral aspect of the left 7th rib. Recommend correlation   with point tenderness at this location.   2. Otherwise no acute traumatic abnormality in the chest.   3. Mild subpleural mixed reticular and ground-glass opacities in the   dependent region of bilateral lower lobes, more pronounced on the   left compared to the right. This is most likely favored to represent   atelectasis. However pulmonary contusion can also be considered in   the differential, especially given history of trauma.        ABDOMEN - PELVIS   1.  No acute traumatic abnormality in the abdomen and pelvis.   2. Suggestion of mild nodular contour of the liver, this could be   related to diaphragmatic indentations. However very subtle or early   hepatic cirrhosis can also be considered in the differential in   appropriate clinical setting.   3. Incidental note of few scattered hepatic hypodense lesions (3 in   number) likely favored to represent cysts.   4. Hyperdense material within the dependent region of the   gallbladder, likely favored to represent vicarious excretion of   contrast. Layering stones/sludge can also be considered in the   differential.        THORACIC AND LUMBAR SPINE   1. Extensive osseous demineralization throughout the thoracic and   lumbar spine. No definite CT evidence of acute fracture or traumatic   malalignment.   2. Moderate lumbar spondylosis as described above.        MACRO:   None        Signed by: Olesya Vu 10/15/2024 10:06 PM   Dictation workstation:   [id number]      CT chest abdomen pelvis w IV contrast   Final Result   CHEST   1.  Suggestion of age-indeterminate subtle nondisplaced fracture   along the lateral aspect of the left 7th rib. Recommend correlation   with point tenderness at this location.   2. Otherwise no acute traumatic abnormality in the chest.   3. Mild subpleural mixed reticular and ground-glass  opacities in the   dependent region of bilateral lower lobes, more pronounced on the   left compared to the right. This is most likely favored to represent   atelectasis. However pulmonary contusion can also be considered in   the differential, especially given history of trauma.        ABDOMEN - PELVIS   1.  No acute traumatic abnormality in the abdomen and pelvis.   2. Suggestion of mild nodular contour of the liver, this could be   related to diaphragmatic indentations. However very subtle or early   hepatic cirrhosis can also be considered in the differential in   appropriate clinical setting.   3. Incidental note of few scattered hepatic hypodense lesions (3 in   number) likely favored to represent cysts.   4. Hyperdense material within the dependent region of the   gallbladder, likely favored to represent vicarious excretion of   contrast. Layering stones/sludge can also be considered in the   differential.        THORACIC AND LUMBAR SPINE   1. Extensive osseous demineralization throughout the thoracic and   lumbar spine. No definite CT evidence of acute fracture or traumatic   malalignment.   2. Moderate lumbar spondylosis as described above.        MACRO:   None        Signed by: Olesya Vu 10/15/2024 10:06 PM   Dictation workstation:   [id number]      XR chest 1 view   Final Result   No acute cardiopulmonary disease.   Signed by Ge Zuniga MD      XR shoulder right 2+ views   Final Result   No acute bony abnormalities.   Signed by Ge Zuniga MD         Interventions -   Medications   sodium chloride 0.9 % bolus 500 mL (0 mL intravenous Stopped 10/15/24 2007)   iohexol (OMNIPaque) 350 mg iodine/mL solution 75 mL (75 mL intravenous Given 10/15/24 1948)   cefTRIAXone (Rocephin) 1 g in dextrose (iso) IV 50 mL (0 g intravenous Stopped 10/15/24 2103)         Past Medical History:   As per HPI  Past Surgical History:   As Per HPI  Allergies:   No Known Allergies  Family History:   No family history  "on file.  Home Meds:  Prior to Admission medications    Not on File     Social History:  - Coming from proxy  - Tobacco: Never      Meds    Scheduled medications  cefTRIAXone, 1 g, intravenous, q24h  enoxaparin, 30 mg, subcutaneous, q24h  polyethylene glycol, 17 g, oral, Daily      Continuous medications     PRN medications  PRN medications: haloperidol lactate, QUEtiapine     Objective    Physical Exam  Constitutional:       Comments: Alert and oriented x 0   Eyes:      General:         Right eye: No discharge.         Left eye: No discharge.      Extraocular Movements: Extraocular movements intact.      Conjunctiva/sclera: Conjunctivae normal.   Cardiovascular:      Rate and Rhythm: Normal rate and regular rhythm.   Pulmonary:      Effort: Pulmonary effort is normal.      Breath sounds: Normal breath sounds.   Abdominal:      General: Abdomen is flat. There is no distension.      Palpations: Abdomen is soft.      Tenderness: There is no abdominal tenderness.   Musculoskeletal:      Right lower leg: No edema.      Left lower leg: No edema.   Neurological:      Mental Status: She is disoriented.      Comments: Appears to be patient's baseline according to family, unable to perform full neuro exam due to AMS          Visit Vitals  /80 (BP Location: Right arm, Patient Position: Lying)   Pulse 104   Temp 36.1 °C (97 °F) (Temporal)   Resp 22   Ht 1.499 m (4' 11\")   Wt 47.7 kg (105 lb 3.2 oz)   SpO2 99%   BMI 21.25 kg/m²   Smoking Status Never   BSA 1.41 m²          Intake/Output Summary (Last 24 hours) at 10/16/2024 0306  Last data filed at 10/16/2024 0041  Gross per 24 hour   Intake 500 ml   Output --   Net 500 ml             Labs:   Results from last 72 hours   Lab Units 10/15/24  1829   SODIUM mmol/L 141   POTASSIUM mmol/L 4.0   CHLORIDE mmol/L 103   CO2 mmol/L 27   BUN mg/dL 24*   CREATININE mg/dL 0.84   GLUCOSE mg/dL 95   CALCIUM mg/dL 8.8   ANION GAP mmol/L 15   EGFR mL/min/1.73m*2 65      Results from last " "72 hours   Lab Units 10/15/24  1829   WBC AUTO x10*3/uL 7.1   HEMOGLOBIN g/dL 12.5   HEMATOCRIT % 38.7   PLATELETS AUTO x10*3/uL 194   NEUTROS PCT AUTO % 73.5   LYMPHS PCT AUTO % 15.4   MONOS PCT AUTO % 8.7   EOS PCT AUTO % 1.8      Lab Results   Component Value Date    CALCIUM 8.8 10/15/2024      No results found for: \"CRP\"    [unfilled]               Assessment and Plan    Melida Greene is a 89 y.o. female admitted on 10/15/2024 for metabolic encephalopathy. Possible differentials include progression of dementia and metabolic encephalopathy 2/2 UTI vs vitamin deficiency. Will treat for metabolic encephalopathy 2/2 UTI and evaluate for vitamin B12 levels    ACUTE MEDICAL ISSUES:  #Encephalopathy  #Possible uncomplicated UTI  -No signs of cystitis or pyelonephritis on CT imaging  -Continue Ceftriaxone  -Urine Cultures pending  -Vitamin B12 pending    CHRONIC MEDICAL ISSUES:  #Dementia, possibly progressing  -Seroquel PRN and Haldol PRN for agitation  -PT/OT consulted  -Bedside Swallow passed, will order diet in am  -Consulted Palliative Care for GOC discussion        Fluids: PO intake  Electrolytes: PRN  Nutrition: NPO pending bedside swallow  Antimicrobials: Rocephin  DVT ppx: Lovenox  GI ppx: None indicated  Catheter: None  Lines: PIV  Supplemental Oxygen: RA  HealthCare Providers:  - PCP: Judit Del Castillo MD   Emergency Contact: Extended Emergency Contact Information  Primary Emergency Contact: MICHELL BROUSSARD  Mobile Phone: [phone]  Relation: Daughter  Secondary Emergency Contact: RORY BROUSSARD  Mobile Phone: [phone]  Relation: Relative   Code: DNR and No Intubation, spoke with patient's daughter and she will bring paperwork in tomorrow.      Disposition: ELOS<48 hours    Janes Lopez DO  Internal Medicine, PGY- 1  10/16/24 at 3:06 AM           "

## 2024-10-16 NOTE — PROGRESS NOTES
10/16/24 1252   Discharge Planning   Living Arrangements Children  (daughter, Adrienne)   Support Systems Children   Assistance Needed A&Ox0-1, ambulates independently but does have episodes where she has increased weakness and utilizes a walker, cane, or WC. Pt's daughter does not let her out of the house alone but she can ambulate around the house independently without concern. Room air at baseline. Family transports.   Type of Residence Private residence   Home or Post Acute Services None   Expected Discharge Disposition Home  (PT/OT evals pending. Pall consulted. Daughter denies any discharge needs at this time.)   Does the patient need discharge transport arranged? No

## 2024-10-16 NOTE — PROGRESS NOTES
Melida Greene is a 89 y.o. female on day 0 of admission presenting with Acute metabolic encephalopathy.      Subjective    The pt was somnolent on initial exam. She only responded with groans at that time. Pt was reassessed with attending and was not coherent. She was pleasant but was not answering questions appropriately. A&Ox0. Talked to daughter over the phone and was informed that pt is at baseline. Her daughter reports mental decline at a steady pace over the last year. However she reports that the pt has good oral intake at home.    Objective     Last Recorded Vitals  /77 (BP Location: Right arm)   Pulse (!) 113   Temp 36.2 °C (97.2 °F) (Temporal)   Resp 20   Wt 47.7 kg (105 lb 3.2 oz)   SpO2 98%   Intake/Output last 3 Shifts:    Intake/Output Summary (Last 24 hours) at 10/16/2024 1107  Last data filed at 10/16/2024 0041  Gross per 24 hour   Intake 500 ml   Output --   Net 500 ml       Admission Weight  Weight: 47.7 kg (105 lb 3.2 oz) (10/16/24 0032)    Daily Weight  10/16/24 : 47.7 kg (105 lb 3.2 oz)    Image Results  CT thoracic spine wo IV contrast, CT lumbar spine wo IV contrast, CT chest abdomen pelvis w IV contrast  Narrative: Interpreted By:  Olesya Vu,   STUDY:  CT CHEST ABDOMEN PELVIS W IV CONTRAST; CT LUMBAR SPINE WO IV  CONTRAST; CT THORACIC SPINE WO IV CONTRAST;  10/15/2024 7:54 pm      INDICATION:  Signs/Symptoms:fall pain confused; Signs/Symptoms:fall pain          COMPARISON:  None.      ACCESSION NUMBER(S):  [id number]; [id number]; [id number]      ORDERING CLINICIAN:  AGUSTIN MAHMOOD      TECHNIQUE:  CT of the chest, abdomen, and pelvis was performed after  administration of intravenous contrast. Contiguous axial images were  obtained at  5 mm slice thickness through the chest, and at  3 mm  through the abdomen and pelvis. Coronal and sagittal reconstructions  at  3 mm slice thickness were performed. Coronal and sagittal  reconstructions of the thoracic and lumbar  spine were also performed  and reviewed as well. The patient received 75 mL of 350 mg iohexol  intravenously.      FINDINGS:  CHEST:      LUNG/PLEURA/LARGE AIRWAYS:  There are subpleural mixed reticular and ground-glass opacities in  the dependent region of bilateral lower lobes, likely favored to  represent dependent atelectasis. No air space opacity, focal  consolidation, pleural effusion/hemothorax, or pneumothorax are  appreciated.  There are no discrete pulmonary nodules.      VESSELS:  No traumatic aortic injury is appreciated within the limitations of  this non-EKG gated study and non arterial phase. Thoracic aorta  demonstrates moderate atherosclerotic calcifications, otherwise is  normal course and caliber. Main pulmonary artery and its branches are  normal in caliber.  Moderate atherosclerotic calcifications of the  coronary arteries.      HEART:  The heart is normal in size.   There is no pericardial effusion.      MEDIASTINUM AND JERRY:  No pneumomediastinum, abnormal mediastinal fluid collection or  mediastinal hematoma are appreciated.  No mediastinal, hilar or  biaxillary adenopathy is present.  The esophagus is normal in course  and caliber.      CHEST WALL AND LOWER NECK:  No acute fracture or dislocation of the included osseous structures  are appreciated.  No suspicious osseous lesions are identified.  The  thoracic wall soft tissues are within normal limits.      ABDOMEN:      LIVER:  No focal perfusion abnormality of the liver is appreciated to suggest  contusion or laceration. There is no subcapsular hematoma, no  perihepatic fluid collection.  Liver demonstrates mild nodular  contour. There are few scattered hypodense lesions in the liver (at  least 3 in number measuring in the range of 6 mm to 1 cm. These are  too small characterize.      GALLBLADDER:  The gallbladder is moderately distended. Layering hyperdensity in the  dependent region of gallbladder, likely favored to represent  vicarious  excretion of contrast.      BILE DUCTS:  The intahepatic and extrahepatic bile ducts are not dilated.      PANCREAS:  The pancreas appears unremarkable.      SPLEEN:  No parenchymal perfusion deficit of the spleen is appreciated to  suggest contusion or laceration. There is no subcapsular hematoma, no  perisplenic fluid collection.      ADRENAL GLANDS:  The bilateral adrenal glands are unremarkable in appearance.      KIDNEYS AND URETERS:  No parenchymal perfusion deficit is appreciated in bilateral kidneys  to suggest contusion or laceration. There is no subcapsular hematoma,  no perinephric fluid collection.  No hydroureteronephrosis or  nephroureterolithiasis is present. There are multiple scattered  hypodense lesions in bilateral kidneys, largest measuring up to 5.5 x  6 cm likely favored to represent cysts.      PELVIS: Evaluation of pelvis is limited due to dense streak artifacts  from right total hip arthroplasty hardware. Please note that below  findings are within this limitation.      BLADDER:  The urinary bladder appears within normal limits.      REPRODUCTIVE ORGANS:  No pelvic mass lesion within limits of evaluation.      BOWEL:  The stomach is unremarkable.  The small bowel is normal in caliber  without evidence of focal wall thickening or obstruction.  There is  no evidence of focal wall thickening or dilatation of the large  bowel.  Appendix is not discretely visualized.      VESSELS:  Moderate atherosclerotic calcifications of the abdominal aorta  without aneurysmal dilatation. IVC appears grossly unremarkable. The  principal vasculature of the abdomen and pelvis is patent.      PERITONEUM/RETROPERITONEUM/LYMPH NODES:  There is no evidence of intra- or retroperitoneal hematoma.  There is  no free or loculated fluid collection, no free intraperitoneal air.  No abdominopelvic lymphadenopathy is present.      BONES INCLUDING THORACIC AND LUMBAR SPINE:  There is subtle cortical buckling along the  lateral aspect of left  7th rib, suggestive of age-indeterminate fracture (best seen sagittal  image 110/135). Postsurgical changes of right total hip arthroplasty  are noted. Hardware is intact without perihardware lucency to suggest  loosening      There is extensive osseous demineralization throughout the visualized  osseous structures limiting evaluation for subtle nondisplaced  fractures. Otherwise thoracic and lumbar vertebral body heights are  maintained without acute compression fracture deformities. No  definite CT evidence of suspicious osseous lesions. Moderate  multilevel discogenic degenerative changes are noted in the lumbar  spine, more pronounced at L4-L5 and L5-S1 levels with moderate loss  of disc space heights and prominent posterior disc osteophyte  complexes.      ABDOMINAL WALL:  Abdominal wall soft tissues appear grossly unremarkable.      Impression: CHEST  1.  Suggestion of age-indeterminate subtle nondisplaced fracture  along the lateral aspect of the left 7th rib. Recommend correlation  with point tenderness at this location.  2. Otherwise no acute traumatic abnormality in the chest.  3. Mild subpleural mixed reticular and ground-glass opacities in the  dependent region of bilateral lower lobes, more pronounced on the  left compared to the right. This is most likely favored to represent  atelectasis. However pulmonary contusion can also be considered in  the differential, especially given history of trauma.      ABDOMEN - PELVIS  1.  No acute traumatic abnormality in the abdomen and pelvis.  2. Suggestion of mild nodular contour of the liver, this could be  related to diaphragmatic indentations. However very subtle or early  hepatic cirrhosis can also be considered in the differential in  appropriate clinical setting.  3. Incidental note of few scattered hepatic hypodense lesions (3 in  number) likely favored to represent cysts.  4. Hyperdense material within the dependent region of  the  gallbladder, likely favored to represent vicarious excretion of  contrast. Layering stones/sludge can also be considered in the  differential.      THORACIC AND LUMBAR SPINE  1. Extensive osseous demineralization throughout the thoracic and  lumbar spine. No definite CT evidence of acute fracture or traumatic  malalignment.  2. Moderate lumbar spondylosis as described above.      MACRO:  None      Signed by: Olesya Vu 10/15/2024 10:06 PM  Dictation workstation:   [id number]  XR chest 1 view  Narrative: STUDY:  Chest Radiograph;  10/15/2024 6:48 PM.  INDICATION:  Weakness.  COMPARISON:  CXR 8/9/2023  ACCESSION NUMBER(S):  [id number]  ORDERING CLINICIAN:  AGUSTIN MAHMOOD  TECHNIQUE:  Frontal chest was obtained at 18:48 hours.  FINDINGS:  CARDIOMEDIASTINAL SILHOUETTE:  Cardiomediastinal silhouette is normal in size and configuration.     LUNGS:  Lungs are clear.  There is no pleural effusion and no evidence of  pneumothorax.     ABDOMEN:  No remarkable upper abdominal findings.     BONES:  No acute osseous changes.  Impression: No acute cardiopulmonary disease.  Signed by Ge Zuniga MD  XR shoulder right 2+ views  Narrative: STUDY:  Shoulder Radiographs; 10/15/2024 6:48 PM.  INDICATION:  Fall.  Right shoulder pain.  COMPARISON:  None.  ACCESSION NUMBER(S):  [id number]  ORDERING CLINICIAN:  AGUSTIN MAHMOOD  TECHNIQUE:  2 view(s) of the right shoulder.  FINDINGS:    There is no displaced fracture.  The alignment is anatomic.  No soft  tissue abnormality is seen.  Impression: No acute bony abnormalities.  Signed by Ge Zuniga MD  CT cervical spine wo IV contrast  Narrative: Interpreted By:  Olesya Vu,   STUDY:  CT CERVICAL SPINE WO IV CONTRAST;  10/15/2024 7:54 pm      INDICATION:  Signs/Symptoms:fall pain.          COMPARISON:  None.      ACCESSION NUMBER(S):  [id number]      ORDERING CLINICIAN:  AGUSTIN MAHMOOD      TECHNIQUE:  Axial CT images of the cervical spine are obtained.  Axial, coronal  and sagittal reconstructions are provided for review.      FINDINGS:      Fractures: There is no evidence for an acute fracture of the cervical  spine.      Vertebral Alignment: Within normal limits.      Craniocervical Junction: The odontoid process and craniocervical  junction are intact.      Vertebrae/Disc Spaces:  There is complete osseous fusion of C5-C6  vertebral bodies, which could be congenital or could be related to  prior surgery. Vertebral body heights are maintained without acute  compression fracture deformities. There are moderate multilevel  discogenic degenerative changes of the cervical spine, more  pronounced at C3-C4, C4-C5 C6-C7 levels with moderate loss of disc  space height and prominent posterior disc osteophyte complexes in  addition to bilateral facet arthropathy contributing to  mild-to-moderate bilateral neural foraminal stenosis, more pronounced  at C3-C4 level.      Prevertebral/Paraspinal Soft Tissues: The prevertebral and paraspinal  soft tissues are unremarkable.          Impression: 1. No acute fracture or traumatic malalignment of the cervical spine.  2. Moderate multilevel discogenic degenerative changes of cervical  spine as described above.      MACRO:  None      Signed by: Olesya Vu 10/15/2024 8:32 PM  Dictation workstation:   [id number]  CT head wo IV contrast  Narrative: Interpreted By:  Olesya Vu,   STUDY:  CT HEAD WO IV CONTRAST;  10/15/2024 7:54 pm      INDICATION:  Trauma. Signs/Symptoms:fall cnofused.      COMPARISON:  CT head without contrast dated 08/09/2023      ACCESSION NUMBER(S):  [id number]      ORDERING CLINICIAN:  AGUSTIN MAHMOOD      TECHNIQUE:  Axial noncontrast head CT      FINDINGS:  Parenchyma: There is mild diffuse cerebral volume loss with chronic  periventricular white matter hypoattenuation, likely favored to be  related to chronic white matter ischemic changes. The grey-white  differentiation is intact. There is no  mass effect or midline shift.  There is no intracranial hemorrhage.      CSF Spaces: The ventricles are diffusely distended, stable compared  to prior CT examination. There is widening of the sulci. There is no  extraaxial fluid collection.      Calvarium: No evidence of fracture was identified.      Paranasal sinuses and mastoids: Visualized paranasal sinuses and  mastoids are clear.      Brain Injury (BIG) guidelines CT values:      Skull fracture: No  SDH (subdural hematoma): None detected  EDH (epidural hemtoma): None detected  IPH (intraparenchymal hemorrhage): None detected  SAH (subarachnoid hemorrhage): None detected  IVH (intraventricular hemorrhage): No      Reference:  Ge NGO, Elia RS, Fili M, et al. The BIG (brain injury  guidelines) project: defining the management of traumatic brain  injury by acute care surgeons. J Trauma Acute Care Surg.  2014;76:360b872.      Impression: No acute intracranial abnormality was identified.      MACRO:  None      Signed by: Olesya Vu 10/15/2024 8:25 PM  Dictation workstation:   [id number]      Physical Exam  Constitutional:       Appearance: She is ill-appearing.      Comments: A&Ox0   HENT:      Head:      Comments: Temporal Wasting     Mouth/Throat:      Mouth: Mucous membranes are dry.   Cardiovascular:      Rate and Rhythm: Normal rate and regular rhythm.   Pulmonary:      Effort: Pulmonary effort is normal.      Breath sounds: Normal breath sounds.   Abdominal:      General: Abdomen is flat. Bowel sounds are normal.      Palpations: Abdomen is soft.   Skin:     General: Skin is dry.   Neurological:      Mental Status: She is disoriented.   Psychiatric:         Mood and Affect: Mood normal.      Comments: Incoherent         Relevant Results             Scheduled medications  cefTRIAXone, 1 g, intravenous, q24h  enoxaparin, 30 mg, subcutaneous, q24h  polyethylene glycol, 17 g, oral, Daily      Continuous medications     PRN medications  PRN medications:  haloperidol lactate, QUEtiapine  Results for orders placed or performed during the hospital encounter of 10/15/24 (from the past 24 hours)   CBC and Auto Differential   Result Value Ref Range    WBC 7.1 4.4 - 11.3 x10*3/uL    nRBC 0.0 0.0 - 0.0 /100 WBCs    RBC 3.90 (L) 4.00 - 5.20 x10*6/uL    Hemoglobin 12.5 12.0 - 16.0 g/dL    Hematocrit 38.7 36.0 - 46.0 %    MCV 99 80 - 100 fL    MCH 32.1 26.0 - 34.0 pg    MCHC 32.3 32.0 - 36.0 g/dL    RDW 13.4 11.5 - 14.5 %    Platelets 194 150 - 450 x10*3/uL    Neutrophils % 73.5 40.0 - 80.0 %    Immature Granulocytes %, Automated 0.3 0.0 - 0.9 %    Lymphocytes % 15.4 13.0 - 44.0 %    Monocytes % 8.7 2.0 - 10.0 %    Eosinophils % 1.8 0.0 - 6.0 %    Basophils % 0.3 0.0 - 2.0 %    Neutrophils Absolute 5.25 1.60 - 5.50 x10*3/uL    Immature Granulocytes Absolute, Automated 0.02 0.00 - 0.50 x10*3/uL    Lymphocytes Absolute 1.10 0.80 - 3.00 x10*3/uL    Monocytes Absolute 0.62 0.05 - 0.80 x10*3/uL    Eosinophils Absolute 0.13 0.00 - 0.40 x10*3/uL    Basophils Absolute 0.02 0.00 - 0.10 x10*3/uL   Magnesium   Result Value Ref Range    Magnesium 2.19 1.60 - 2.40 mg/dL   Comprehensive metabolic panel   Result Value Ref Range    Glucose 95 74 - 99 mg/dL    Sodium 141 136 - 145 mmol/L    Potassium 4.0 3.5 - 5.3 mmol/L    Chloride 103 98 - 107 mmol/L    Bicarbonate 27 21 - 32 mmol/L    Anion Gap 15 10 - 20 mmol/L    Urea Nitrogen 24 (H) 6 - 23 mg/dL    Creatinine 0.84 0.50 - 1.05 mg/dL    eGFR 65 >60 mL/min/1.73m*2    Calcium 8.8 8.6 - 10.3 mg/dL    Albumin 4.0 3.4 - 5.0 g/dL    Alkaline Phosphatase 63 33 - 136 U/L    Total Protein 6.8 6.4 - 8.2 g/dL    AST 37 9 - 39 U/L    Bilirubin, Total 0.8 0.0 - 1.2 mg/dL    ALT 20 7 - 45 U/L   Lactate   Result Value Ref Range    Lactate 0.9 0.4 - 2.0 mmol/L   Protime-INR   Result Value Ref Range    Protime 11.0 9.8 - 12.8 seconds    INR 1.0 0.9 - 1.1   aPTT   Result Value Ref Range    aPTT 30 27 - 38 seconds   Troponin I, High Sensitivity   Result  Value Ref Range    Troponin I, High Sensitivity 14 (H) 0 - 13 ng/L   Sars-CoV-2 PCR   Result Value Ref Range    Coronavirus 2019, PCR Not Detected Not Detected   Influenza A, and B PCR   Result Value Ref Range    Flu A Result Not Detected Not Detected    Flu B Result Not Detected Not Detected   Urinalysis with Reflex Culture and Microscopic   Result Value Ref Range    Color, Urine Colorless (N) Light-Yellow, Yellow, Dark-Yellow    Appearance, Urine Turbid (N) Clear    Specific Gravity, Urine 1.017 1.005 - 1.035    pH, Urine 8.0 5.0, 5.5, 6.0, 6.5, 7.0, 7.5, 8.0    Protein, Urine 30 (1+) (A) NEGATIVE, 10 (TRACE), 20 (TRACE) mg/dL    Glucose, Urine Normal Normal mg/dL    Blood, Urine NEGATIVE NEGATIVE    Ketones, Urine TRACE (A) NEGATIVE mg/dL    Bilirubin, Urine NEGATIVE NEGATIVE    Urobilinogen, Urine Normal Normal mg/dL    Nitrite, Urine 1+ (A) NEGATIVE    Leukocyte Esterase, Urine 75 Tacos/µL (A) NEGATIVE   Extra Urine Gray Tube   Result Value Ref Range    Extra Tube Hold for add-ons.    Microscopic Only, Urine   Result Value Ref Range    WBC, Urine 6-10 (A) 1-5, NONE /HPF    RBC, Urine 3-5 NONE, 1-2, 3-5 /HPF    Bacteria, Urine 1+ (A) NONE SEEN /HPF    Triple Phosphate Crystals, Urine 1+ NONE, 1+ /HPF   CBC   Result Value Ref Range    WBC 8.1 4.4 - 11.3 x10*3/uL    nRBC 0.0 0.0 - 0.0 /100 WBCs    RBC 3.73 (L) 4.00 - 5.20 x10*6/uL    Hemoglobin 11.9 (L) 12.0 - 16.0 g/dL    Hematocrit 37.1 36.0 - 46.0 %     80 - 100 fL    MCH 31.9 26.0 - 34.0 pg    MCHC 32.1 32.0 - 36.0 g/dL    RDW 13.2 11.5 - 14.5 %    Platelets 182 150 - 450 x10*3/uL   Comprehensive metabolic panel   Result Value Ref Range    Glucose 84 74 - 99 mg/dL    Sodium 141 136 - 145 mmol/L    Potassium 4.0 3.5 - 5.3 mmol/L    Chloride 105 98 - 107 mmol/L    Bicarbonate 28 21 - 32 mmol/L    Anion Gap 12 10 - 20 mmol/L    Urea Nitrogen 23 6 - 23 mg/dL    Creatinine 0.92 0.50 - 1.05 mg/dL    eGFR 59 (L) >60 mL/min/1.73m*2    Calcium 8.8 8.6 - 10.3 mg/dL     Albumin 3.7 3.4 - 5.0 g/dL    Alkaline Phosphatase 60 33 - 136 U/L    Total Protein 6.6 6.4 - 8.2 g/dL    AST 40 (H) 9 - 39 U/L    Bilirubin, Total 0.6 0.0 - 1.2 mg/dL    ALT 19 7 - 45 U/L   Magnesium   Result Value Ref Range    Magnesium 2.15 1.60 - 2.40 mg/dL   Phosphorus   Result Value Ref Range    Phosphorus 3.7 2.5 - 4.9 mg/dL     CT thoracic spine wo IV contrast    Result Date: 10/15/2024  Interpreted By:  Olesya Vu, STUDY: CT CHEST ABDOMEN PELVIS W IV CONTRAST; CT LUMBAR SPINE WO IV CONTRAST; CT THORACIC SPINE WO IV CONTRAST;  10/15/2024 7:54 pm   INDICATION: Signs/Symptoms:fall pain confused; Signs/Symptoms:fall pain     COMPARISON: None.   ACCESSION NUMBER(S): [id number]; [id number]; [id number]   ORDERING CLINICIAN: AGUSTIN MAHMOOD   TECHNIQUE: CT of the chest, abdomen, and pelvis was performed after administration of intravenous contrast. Contiguous axial images were obtained at  5 mm slice thickness through the chest, and at  3 mm through the abdomen and pelvis. Coronal and sagittal reconstructions at  3 mm slice thickness were performed. Coronal and sagittal reconstructions of the thoracic and lumbar spine were also performed and reviewed as well. The patient received 75 mL of 350 mg iohexol intravenously.   FINDINGS: CHEST:   LUNG/PLEURA/LARGE AIRWAYS: There are subpleural mixed reticular and ground-glass opacities in the dependent region of bilateral lower lobes, likely favored to represent dependent atelectasis. No air space opacity, focal consolidation, pleural effusion/hemothorax, or pneumothorax are appreciated.  There are no discrete pulmonary nodules.   VESSELS: No traumatic aortic injury is appreciated within the limitations of this non-EKG gated study and non arterial phase. Thoracic aorta demonstrates moderate atherosclerotic calcifications, otherwise is normal course and caliber. Main pulmonary artery and its branches are normal in caliber.  Moderate atherosclerotic  calcifications of the coronary arteries.   HEART: The heart is normal in size.   There is no pericardial effusion.   MEDIASTINUM AND JERRY: No pneumomediastinum, abnormal mediastinal fluid collection or mediastinal hematoma are appreciated.  No mediastinal, hilar or biaxillary adenopathy is present.  The esophagus is normal in course and caliber.   CHEST WALL AND LOWER NECK: No acute fracture or dislocation of the included osseous structures are appreciated.  No suspicious osseous lesions are identified.  The thoracic wall soft tissues are within normal limits.   ABDOMEN:   LIVER: No focal perfusion abnormality of the liver is appreciated to suggest contusion or laceration. There is no subcapsular hematoma, no perihepatic fluid collection.  Liver demonstrates mild nodular contour. There are few scattered hypodense lesions in the liver (at least 3 in number measuring in the range of 6 mm to 1 cm. These are too small characterize.   GALLBLADDER: The gallbladder is moderately distended. Layering hyperdensity in the dependent region of gallbladder, likely favored to represent vicarious excretion of contrast.   BILE DUCTS: The intahepatic and extrahepatic bile ducts are not dilated.   PANCREAS: The pancreas appears unremarkable.   SPLEEN: No parenchymal perfusion deficit of the spleen is appreciated to suggest contusion or laceration. There is no subcapsular hematoma, no perisplenic fluid collection.   ADRENAL GLANDS: The bilateral adrenal glands are unremarkable in appearance.   KIDNEYS AND URETERS: No parenchymal perfusion deficit is appreciated in bilateral kidneys to suggest contusion or laceration. There is no subcapsular hematoma, no perinephric fluid collection.  No hydroureteronephrosis or nephroureterolithiasis is present. There are multiple scattered hypodense lesions in bilateral kidneys, largest measuring up to 5.5 x 6 cm likely favored to represent cysts.   PELVIS: Evaluation of pelvis is limited due to dense  streak artifacts from right total hip arthroplasty hardware. Please note that below findings are within this limitation.   BLADDER: The urinary bladder appears within normal limits.   REPRODUCTIVE ORGANS: No pelvic mass lesion within limits of evaluation.   BOWEL: The stomach is unremarkable.  The small bowel is normal in caliber without evidence of focal wall thickening or obstruction.  There is no evidence of focal wall thickening or dilatation of the large bowel.  Appendix is not discretely visualized.   VESSELS: Moderate atherosclerotic calcifications of the abdominal aorta without aneurysmal dilatation. IVC appears grossly unremarkable. The principal vasculature of the abdomen and pelvis is patent.   PERITONEUM/RETROPERITONEUM/LYMPH NODES: There is no evidence of intra- or retroperitoneal hematoma.  There is no free or loculated fluid collection, no free intraperitoneal air. No abdominopelvic lymphadenopathy is present.   BONES INCLUDING THORACIC AND LUMBAR SPINE: There is subtle cortical buckling along the lateral aspect of left 7th rib, suggestive of age-indeterminate fracture (best seen sagittal image 110/135). Postsurgical changes of right total hip arthroplasty are noted. Hardware is intact without perihardware lucency to suggest loosening   There is extensive osseous demineralization throughout the visualized osseous structures limiting evaluation for subtle nondisplaced fractures. Otherwise thoracic and lumbar vertebral body heights are maintained without acute compression fracture deformities. No definite CT evidence of suspicious osseous lesions. Moderate multilevel discogenic degenerative changes are noted in the lumbar spine, more pronounced at L4-L5 and L5-S1 levels with moderate loss of disc space heights and prominent posterior disc osteophyte complexes.   ABDOMINAL WALL: Abdominal wall soft tissues appear grossly unremarkable.       CHEST 1.  Suggestion of age-indeterminate subtle nondisplaced  fracture along the lateral aspect of the left 7th rib. Recommend correlation with point tenderness at this location. 2. Otherwise no acute traumatic abnormality in the chest. 3. Mild subpleural mixed reticular and ground-glass opacities in the dependent region of bilateral lower lobes, more pronounced on the left compared to the right. This is most likely favored to represent atelectasis. However pulmonary contusion can also be considered in the differential, especially given history of trauma.   ABDOMEN - PELVIS 1.  No acute traumatic abnormality in the abdomen and pelvis. 2. Suggestion of mild nodular contour of the liver, this could be related to diaphragmatic indentations. However very subtle or early hepatic cirrhosis can also be considered in the differential in appropriate clinical setting. 3. Incidental note of few scattered hepatic hypodense lesions (3 in number) likely favored to represent cysts. 4. Hyperdense material within the dependent region of the gallbladder, likely favored to represent vicarious excretion of contrast. Layering stones/sludge can also be considered in the differential.   THORACIC AND LUMBAR SPINE 1. Extensive osseous demineralization throughout the thoracic and lumbar spine. No definite CT evidence of acute fracture or traumatic malalignment. 2. Moderate lumbar spondylosis as described above.   MACRO: None   Signed by: Olesya Vu 10/15/2024 10:06 PM Dictation workstation:   [id number]    CT lumbar spine wo IV contrast    Result Date: 10/15/2024  Interpreted By:  Olesya Vu, STUDY: CT CHEST ABDOMEN PELVIS W IV CONTRAST; CT LUMBAR SPINE WO IV CONTRAST; CT THORACIC SPINE WO IV CONTRAST;  10/15/2024 7:54 pm   INDICATION: Signs/Symptoms:fall pain confused; Signs/Symptoms:fall pain     COMPARISON: None.   ACCESSION NUMBER(S): [id number]; [id number]; [id number]   ORDERING CLINICIAN: AGUSTIN MAHMOOD   TECHNIQUE: CT of the chest, abdomen, and pelvis was performed after  administration of intravenous contrast. Contiguous axial images were obtained at  5 mm slice thickness through the chest, and at  3 mm through the abdomen and pelvis. Coronal and sagittal reconstructions at  3 mm slice thickness were performed. Coronal and sagittal reconstructions of the thoracic and lumbar spine were also performed and reviewed as well. The patient received 75 mL of 350 mg iohexol intravenously.   FINDINGS: CHEST:   LUNG/PLEURA/LARGE AIRWAYS: There are subpleural mixed reticular and ground-glass opacities in the dependent region of bilateral lower lobes, likely favored to represent dependent atelectasis. No air space opacity, focal consolidation, pleural effusion/hemothorax, or pneumothorax are appreciated.  There are no discrete pulmonary nodules.   VESSELS: No traumatic aortic injury is appreciated within the limitations of this non-EKG gated study and non arterial phase. Thoracic aorta demonstrates moderate atherosclerotic calcifications, otherwise is normal course and caliber. Main pulmonary artery and its branches are normal in caliber.  Moderate atherosclerotic calcifications of the coronary arteries.   HEART: The heart is normal in size.   There is no pericardial effusion.   MEDIASTINUM AND JERRY: No pneumomediastinum, abnormal mediastinal fluid collection or mediastinal hematoma are appreciated.  No mediastinal, hilar or biaxillary adenopathy is present.  The esophagus is normal in course and caliber.   CHEST WALL AND LOWER NECK: No acute fracture or dislocation of the included osseous structures are appreciated.  No suspicious osseous lesions are identified.  The thoracic wall soft tissues are within normal limits.   ABDOMEN:   LIVER: No focal perfusion abnormality of the liver is appreciated to suggest contusion or laceration. There is no subcapsular hematoma, no perihepatic fluid collection.  Liver demonstrates mild nodular contour. There are few scattered hypodense lesions in the liver  (at least 3 in number measuring in the range of 6 mm to 1 cm. These are too small characterize.   GALLBLADDER: The gallbladder is moderately distended. Layering hyperdensity in the dependent region of gallbladder, likely favored to represent vicarious excretion of contrast.   BILE DUCTS: The intahepatic and extrahepatic bile ducts are not dilated.   PANCREAS: The pancreas appears unremarkable.   SPLEEN: No parenchymal perfusion deficit of the spleen is appreciated to suggest contusion or laceration. There is no subcapsular hematoma, no perisplenic fluid collection.   ADRENAL GLANDS: The bilateral adrenal glands are unremarkable in appearance.   KIDNEYS AND URETERS: No parenchymal perfusion deficit is appreciated in bilateral kidneys to suggest contusion or laceration. There is no subcapsular hematoma, no perinephric fluid collection.  No hydroureteronephrosis or nephroureterolithiasis is present. There are multiple scattered hypodense lesions in bilateral kidneys, largest measuring up to 5.5 x 6 cm likely favored to represent cysts.   PELVIS: Evaluation of pelvis is limited due to dense streak artifacts from right total hip arthroplasty hardware. Please note that below findings are within this limitation.   BLADDER: The urinary bladder appears within normal limits.   REPRODUCTIVE ORGANS: No pelvic mass lesion within limits of evaluation.   BOWEL: The stomach is unremarkable.  The small bowel is normal in caliber without evidence of focal wall thickening or obstruction.  There is no evidence of focal wall thickening or dilatation of the large bowel.  Appendix is not discretely visualized.   VESSELS: Moderate atherosclerotic calcifications of the abdominal aorta without aneurysmal dilatation. IVC appears grossly unremarkable. The principal vasculature of the abdomen and pelvis is patent.   PERITONEUM/RETROPERITONEUM/LYMPH NODES: There is no evidence of intra- or retroperitoneal hematoma.  There is no free or loculated  fluid collection, no free intraperitoneal air. No abdominopelvic lymphadenopathy is present.   BONES INCLUDING THORACIC AND LUMBAR SPINE: There is subtle cortical buckling along the lateral aspect of left 7th rib, suggestive of age-indeterminate fracture (best seen sagittal image 110/135). Postsurgical changes of right total hip arthroplasty are noted. Hardware is intact without perihardware lucency to suggest loosening   There is extensive osseous demineralization throughout the visualized osseous structures limiting evaluation for subtle nondisplaced fractures. Otherwise thoracic and lumbar vertebral body heights are maintained without acute compression fracture deformities. No definite CT evidence of suspicious osseous lesions. Moderate multilevel discogenic degenerative changes are noted in the lumbar spine, more pronounced at L4-L5 and L5-S1 levels with moderate loss of disc space heights and prominent posterior disc osteophyte complexes.   ABDOMINAL WALL: Abdominal wall soft tissues appear grossly unremarkable.       CHEST 1.  Suggestion of age-indeterminate subtle nondisplaced fracture along the lateral aspect of the left 7th rib. Recommend correlation with point tenderness at this location. 2. Otherwise no acute traumatic abnormality in the chest. 3. Mild subpleural mixed reticular and ground-glass opacities in the dependent region of bilateral lower lobes, more pronounced on the left compared to the right. This is most likely favored to represent atelectasis. However pulmonary contusion can also be considered in the differential, especially given history of trauma.   ABDOMEN - PELVIS 1.  No acute traumatic abnormality in the abdomen and pelvis. 2. Suggestion of mild nodular contour of the liver, this could be related to diaphragmatic indentations. However very subtle or early hepatic cirrhosis can also be considered in the differential in appropriate clinical setting. 3. Incidental note of few scattered  hepatic hypodense lesions (3 in number) likely favored to represent cysts. 4. Hyperdense material within the dependent region of the gallbladder, likely favored to represent vicarious excretion of contrast. Layering stones/sludge can also be considered in the differential.   THORACIC AND LUMBAR SPINE 1. Extensive osseous demineralization throughout the thoracic and lumbar spine. No definite CT evidence of acute fracture or traumatic malalignment. 2. Moderate lumbar spondylosis as described above.   MACRO: None   Signed by: Olesya Vu 10/15/2024 10:06 PM Dictation workstation:   [id number]    CT chest abdomen pelvis w IV contrast    Result Date: 10/15/2024  Interpreted By:  Olesya Vu, STUDY: CT CHEST ABDOMEN PELVIS W IV CONTRAST; CT LUMBAR SPINE WO IV CONTRAST; CT THORACIC SPINE WO IV CONTRAST;  10/15/2024 7:54 pm   INDICATION: Signs/Symptoms:fall pain confused; Signs/Symptoms:fall pain     COMPARISON: None.   ACCESSION NUMBER(S): [id number]; [id number]; [id number]   ORDERING CLINICIAN: AGUSTIN MAHMOOD   TECHNIQUE: CT of the chest, abdomen, and pelvis was performed after administration of intravenous contrast. Contiguous axial images were obtained at  5 mm slice thickness through the chest, and at  3 mm through the abdomen and pelvis. Coronal and sagittal reconstructions at  3 mm slice thickness were performed. Coronal and sagittal reconstructions of the thoracic and lumbar spine were also performed and reviewed as well. The patient received 75 mL of 350 mg iohexol intravenously.   FINDINGS: CHEST:   LUNG/PLEURA/LARGE AIRWAYS: There are subpleural mixed reticular and ground-glass opacities in the dependent region of bilateral lower lobes, likely favored to represent dependent atelectasis. No air space opacity, focal consolidation, pleural effusion/hemothorax, or pneumothorax are appreciated.  There are no discrete pulmonary nodules.   VESSELS: No traumatic aortic injury is appreciated within the  limitations of this non-EKG gated study and non arterial phase. Thoracic aorta demonstrates moderate atherosclerotic calcifications, otherwise is normal course and caliber. Main pulmonary artery and its branches are normal in caliber.  Moderate atherosclerotic calcifications of the coronary arteries.   HEART: The heart is normal in size.   There is no pericardial effusion.   MEDIASTINUM AND JERRY: No pneumomediastinum, abnormal mediastinal fluid collection or mediastinal hematoma are appreciated.  No mediastinal, hilar or biaxillary adenopathy is present.  The esophagus is normal in course and caliber.   CHEST WALL AND LOWER NECK: No acute fracture or dislocation of the included osseous structures are appreciated.  No suspicious osseous lesions are identified.  The thoracic wall soft tissues are within normal limits.   ABDOMEN:   LIVER: No focal perfusion abnormality of the liver is appreciated to suggest contusion or laceration. There is no subcapsular hematoma, no perihepatic fluid collection.  Liver demonstrates mild nodular contour. There are few scattered hypodense lesions in the liver (at least 3 in number measuring in the range of 6 mm to 1 cm. These are too small characterize.   GALLBLADDER: The gallbladder is moderately distended. Layering hyperdensity in the dependent region of gallbladder, likely favored to represent vicarious excretion of contrast.   BILE DUCTS: The intahepatic and extrahepatic bile ducts are not dilated.   PANCREAS: The pancreas appears unremarkable.   SPLEEN: No parenchymal perfusion deficit of the spleen is appreciated to suggest contusion or laceration. There is no subcapsular hematoma, no perisplenic fluid collection.   ADRENAL GLANDS: The bilateral adrenal glands are unremarkable in appearance.   KIDNEYS AND URETERS: No parenchymal perfusion deficit is appreciated in bilateral kidneys to suggest contusion or laceration. There is no subcapsular hematoma, no perinephric fluid  collection.  No hydroureteronephrosis or nephroureterolithiasis is present. There are multiple scattered hypodense lesions in bilateral kidneys, largest measuring up to 5.5 x 6 cm likely favored to represent cysts.   PELVIS: Evaluation of pelvis is limited due to dense streak artifacts from right total hip arthroplasty hardware. Please note that below findings are within this limitation.   BLADDER: The urinary bladder appears within normal limits.   REPRODUCTIVE ORGANS: No pelvic mass lesion within limits of evaluation.   BOWEL: The stomach is unremarkable.  The small bowel is normal in caliber without evidence of focal wall thickening or obstruction.  There is no evidence of focal wall thickening or dilatation of the large bowel.  Appendix is not discretely visualized.   VESSELS: Moderate atherosclerotic calcifications of the abdominal aorta without aneurysmal dilatation. IVC appears grossly unremarkable. The principal vasculature of the abdomen and pelvis is patent.   PERITONEUM/RETROPERITONEUM/LYMPH NODES: There is no evidence of intra- or retroperitoneal hematoma.  There is no free or loculated fluid collection, no free intraperitoneal air. No abdominopelvic lymphadenopathy is present.   BONES INCLUDING THORACIC AND LUMBAR SPINE: There is subtle cortical buckling along the lateral aspect of left 7th rib, suggestive of age-indeterminate fracture (best seen sagittal image 110/135). Postsurgical changes of right total hip arthroplasty are noted. Hardware is intact without perihardware lucency to suggest loosening   There is extensive osseous demineralization throughout the visualized osseous structures limiting evaluation for subtle nondisplaced fractures. Otherwise thoracic and lumbar vertebral body heights are maintained without acute compression fracture deformities. No definite CT evidence of suspicious osseous lesions. Moderate multilevel discogenic degenerative changes are noted in the lumbar spine, more  pronounced at L4-L5 and L5-S1 levels with moderate loss of disc space heights and prominent posterior disc osteophyte complexes.   ABDOMINAL WALL: Abdominal wall soft tissues appear grossly unremarkable.       CHEST 1.  Suggestion of age-indeterminate subtle nondisplaced fracture along the lateral aspect of the left 7th rib. Recommend correlation with point tenderness at this location. 2. Otherwise no acute traumatic abnormality in the chest. 3. Mild subpleural mixed reticular and ground-glass opacities in the dependent region of bilateral lower lobes, more pronounced on the left compared to the right. This is most likely favored to represent atelectasis. However pulmonary contusion can also be considered in the differential, especially given history of trauma.   ABDOMEN - PELVIS 1.  No acute traumatic abnormality in the abdomen and pelvis. 2. Suggestion of mild nodular contour of the liver, this could be related to diaphragmatic indentations. However very subtle or early hepatic cirrhosis can also be considered in the differential in appropriate clinical setting. 3. Incidental note of few scattered hepatic hypodense lesions (3 in number) likely favored to represent cysts. 4. Hyperdense material within the dependent region of the gallbladder, likely favored to represent vicarious excretion of contrast. Layering stones/sludge can also be considered in the differential.   THORACIC AND LUMBAR SPINE 1. Extensive osseous demineralization throughout the thoracic and lumbar spine. No definite CT evidence of acute fracture or traumatic malalignment. 2. Moderate lumbar spondylosis as described above.   MACRO: None   Signed by: Olesya Vu 10/15/2024 10:06 PM Dictation workstation:   [id number]    XR chest 1 view    Result Date: 10/15/2024  STUDY: Chest Radiograph;  10/15/2024 6:48 PM. INDICATION: Weakness. COMPARISON: CXR 8/9/2023 ACCESSION NUMBER(S): [id number] ORDERING CLINICIAN: AGUSTIN MAHMOOD TECHNIQUE:   Frontal chest was obtained at 18:48 hours. FINDINGS: CARDIOMEDIASTINAL SILHOUETTE: Cardiomediastinal silhouette is normal in size and configuration.  LUNGS: Lungs are clear.  There is no pleural effusion and no evidence of pneumothorax.  ABDOMEN: No remarkable upper abdominal findings.  BONES: No acute osseous changes.    No acute cardiopulmonary disease. Signed by Ge Zuniga MD    XR shoulder right 2+ views    Result Date: 10/15/2024  STUDY: Shoulder Radiographs; 10/15/2024 6:48 PM. INDICATION: Fall.  Right shoulder pain. COMPARISON: None. ACCESSION NUMBER(S): [id number] ORDERING CLINICIAN: AGUSTIN MAHMOOD TECHNIQUE:  2 view(s) of the right shoulder. FINDINGS:  There is no displaced fracture.  The alignment is anatomic.  No soft tissue abnormality is seen.    No acute bony abnormalities. Signed by Ge Zuniga MD    CT cervical spine wo IV contrast    Result Date: 10/15/2024  Interpreted By:  Olesya Vu, STUDY: CT CERVICAL SPINE WO IV CONTRAST;  10/15/2024 7:54 pm   INDICATION: Signs/Symptoms:fall pain.     COMPARISON: None.   ACCESSION NUMBER(S): [id number]   ORDERING CLINICIAN: AGUSTIN MAHMOOD   TECHNIQUE: Axial CT images of the cervical spine are obtained. Axial, coronal and sagittal reconstructions are provided for review.   FINDINGS:   Fractures: There is no evidence for an acute fracture of the cervical spine.   Vertebral Alignment: Within normal limits.   Craniocervical Junction: The odontoid process and craniocervical junction are intact.   Vertebrae/Disc Spaces:  There is complete osseous fusion of C5-C6 vertebral bodies, which could be congenital or could be related to prior surgery. Vertebral body heights are maintained without acute compression fracture deformities. There are moderate multilevel discogenic degenerative changes of the cervical spine, more pronounced at C3-C4, C4-C5 C6-C7 levels with moderate loss of disc space height and prominent posterior disc osteophyte complexes in  addition to bilateral facet arthropathy contributing to mild-to-moderate bilateral neural foraminal stenosis, more pronounced at C3-C4 level.   Prevertebral/Paraspinal Soft Tissues: The prevertebral and paraspinal soft tissues are unremarkable.         1. No acute fracture or traumatic malalignment of the cervical spine. 2. Moderate multilevel discogenic degenerative changes of cervical spine as described above.   MACRO: None   Signed by: Olesya Vu 10/15/2024 8:32 PM Dictation workstation:   [id number]    CT head wo IV contrast    Result Date: 10/15/2024  Interpreted By:  Olesya Vu, STUDY: CT HEAD WO IV CONTRAST;  10/15/2024 7:54 pm   INDICATION: Trauma. Signs/Symptoms:fall cnofused.   COMPARISON: CT head without contrast dated 08/09/2023   ACCESSION NUMBER(S): [id number]   ORDERING CLINICIAN: AGUSTIN MAHMOOD   TECHNIQUE: Axial noncontrast head CT   FINDINGS: Parenchyma: There is mild diffuse cerebral volume loss with chronic periventricular white matter hypoattenuation, likely favored to be related to chronic white matter ischemic changes. The grey-white differentiation is intact. There is no mass effect or midline shift. There is no intracranial hemorrhage.   CSF Spaces: The ventricles are diffusely distended, stable compared to prior CT examination. There is widening of the sulci. There is no extraaxial fluid collection.   Calvarium: No evidence of fracture was identified.   Paranasal sinuses and mastoids: Visualized paranasal sinuses and mastoids are clear.   Brain Injury (BIG) guidelines CT values:   Skull fracture: No SDH (subdural hematoma): None detected EDH (epidural hemtoma): None detected IPH (intraparenchymal hemorrhage): None detected SAH (subarachnoid hemorrhage): None detected IVH (intraventricular hemorrhage): No   Reference: Ge NGO, Elia RS, Fili M, et al. The BIG (brain injury guidelines) project: defining the management of traumatic brain injury by acute care surgeons. J  Trauma Acute Care Surg. 2014;76:793r438.       No acute intracranial abnormality was identified.   MACRO: None   Signed by: Olesya Vu 10/15/2024 8:25 PM Dictation workstation:   [id number]     Assessment & Plan  Acute metabolic encephalopathy    Melida Greene is a 89 y.o. female admitted on 10/15/2024 for metabolic encephalopathy. Possible differentials include progression of dementia and metabolic encephalopathy 2/2 UTI vs vitamin deficiency. Will treat for metabolic encephalopathy 2/2 UTI and evaluate for vitamin B12 levels     ACUTE MEDICAL ISSUES:  #Metabolic Encephalopathy 2/2 infection  #Uncomplicated UTI  -No signs of cystitis or pyelonephritis on CT imaging  -Continue Ceftriaxone  -Urine Cultures pending  -Vitamin B12 pending  - Folate, TSH ordered  - Talked to daughter over the phone and was informed that pt is at baseline. Anticipating palliative GOC discussion.    CHRONIC MEDICAL ISSUES:  #Dementia, possibly progressing  -Seroquel PRN and Haldol PRN for agitation  -PT/OT consulted  -Bedside Swallow passed, diet ordered  -Consulted Palliative Care for GOC discussion     Fluids: PO intake  Electrolytes: PRN  Nutrition: Soft and Bite sized  Antimicrobials: Rocephin  DVT ppx: Lovenox  GI ppx: None indicated  Catheter: None  Lines: PIV  Supplemental Oxygen: RA  HealthCare Providers:  - PCP: Judit Del Castillo MD   Emergency Contact: Extended Emergency Contact Information  Primary Emergency Contact: MICHELL BROUSSARD  Mobile Phone: [phone]  Relation: Daughter  Secondary Emergency Contact: RORY BROUSSARD  Mobile Phone: [phone]  Relation: Relative   Code: DNR and No Intubation, spoke with patient's daughter and she will bring paperwork in today            Akhil Meza DO

## 2024-10-16 NOTE — HOSPITAL COURSE
Melida Greene is a 89 y.o. female with pmh of dementia, unspecified type, presented to the hospital for altered mental status and increased lethargy.     ED COURSE:   Vitals - /80 (BP Location: Right arm, Patient Position: Lying)   Pulse 104   Temp 36.1 °C (97 °F) (Temporal)   Resp 22   Wt 47.7 kg (105 lb 3.2 oz)   SpO2 99%     Labs: WNL    Imaging:   CT Cervical spine: No fracture, moderate dgenerative changes  CT Thoracic spine: fracture along lateral left 7th rib  Ground glass opacities in bilateral lower lobes  CT Lumbar: Osseous demineralization. No fracture. Moderate spondylolysis  CTAP: no acute trauma.  CXR: No acute cardiopulmonary diseae  XR Shoulder: No bony abnormalities    Interventions   mL bolus  Rocephin 1g    Floor Course:  Rocephin was continued. Pt was at baseline according to daughter. Discharging home with hospice. DC antibiotics.    Pt will follow up with PCP post hospital stay

## 2024-10-16 NOTE — CONSULTS
PALLIATIVE MEDICINE CONSULT   Attending Provider: Frandy Bowman DO   Reason For Consult: Assistance with goals of care, complex medical decision making, code status discussion, hospice discussion  INTRODUCTION TO PALLIATIVE MEDICINE    Patient seen at bedside, AxOx0 at baseline. Patient's daughter Adrienne called and consented to an ACP meeting via the phone.   Patient's Capacity: Patient remains altered, does not have capacity to make their own medical decisions at this time. Unable to participate in ROS or goals of care discussion. Surrogate decision maker is her daughter Adrienne.  Service: Palliative Medicine was introduced as a specialty service for patients with serious illness to help with symptom management, improve quality of life, assist with goals of care conversations, navigate complex decision making, and provide support to patients and families. Support and empathy was provided throughout the encounter.     SUBJECTIVE    History Of Present Illness  Melida Greene is a 89 y.o. female with past medical history of dementia who presented to the ED on 10/15/24 with a chief complaint of altered mental status and increased lethargy and weakness. CT imaging did not indicate any acute intracranial process and CT chest revealed atelectasis. Patient was admitted for further evaluation and management of worsening dementia and concern for UTI on UA. Urine culture is pending and patient was started on ceftriaxone. Palliative Medicine was consulted for goals of care and complex medical decision making.     History obtained from chart review including ED note, H&P, patient's daily progress notes, review of lab/test results, and discussion with primary team and bedside RN.    Allergies  Patient has no known allergies.    Past Medical History  She has no past medical history on file.     Surgical History  She has no past surgical history on file.    Family History  No family history on file.     Social  "History  She reports that she has never smoked. She has never used smokeless tobacco. No history on file for alcohol use and drug use.    Palliative Medicine Social History:  The patient lives with her daughter Adrienne and her . They and the rest of their family are very involved in patient's care and patient is dependent upon them for all ADL's.      ADVANCE CARE PLANNING: Patient and/or family consented to a voluntary advance care planning meeting    Advance Care Planning    Serious Illness Assessment:    Perception: Patient's daughter Adrienne     Life Limiting Disease: Progression of dementia to end stage posing threat to life or function     Disease Specific Counseling/Prognosis Discussed: Counseling provided on patient's current clinical condition, including dementia, poor prognosis, and management plan. Counseling provided on symptoms consistent with end-stage dementia including loss of communication, ambulation, swallowing, and incontinence.     Impression of Disease and Stage: Adrienne expressing understanding of overall health status and severity of illness. Adrienne very well educated on dementia, it's progression, and communication and care techniques to utilize for her mother. However, she is endorsing that patient has had a significant decline over the past six months and is continuing to decline to the point where they need more assistance at home in managing her care and escalating symptoms.     Goals/Hopes: Discussion ensued about goals for patient's medical care going forward. Actively listened to Adrienne sharing her perspective on patient's current quality of life, disease course/progression, and symptom and treatment burden. Goals are clear for improved comfort and quality of life. Adrienne aware that her mother's condition is progressive and worsening and her goal is \"to give her mom the best life she can possibly have while she is nearing the end of her life\".    Hospice Discussion: In setting of " "goals for comfort, counseling was provided on the benefit of Hospice Services to support patient and family by providing counseling, symptom management, prioritizing comfort and quality of life, alleviation of suffering, and allowing patient to pass with comfort and dignity. In setting of end stage dementia, Adrienne does not want patient to keep returning to the hospital as this has the potential to cause the patient more harm/confusion/discomfort than potential benefit in setting of end stage disease. Adrienne in agreement for author to place a consult to hospice but wants to discuss hospice choices with the rest of her family prior to deciding which hospice they would like to utilize. Will call author later this evening or early tomorrow with her choice. Tentative plan for patient to discharge home tomorrow and meet with hospice at home as Adrienne has extensive equipment and supplies for patient already in place at home.     Resuscitation Assessment: Patient is a DNR/DNI    Advance Directives: Patient does not have a HPOA or living will, does not have capacity to complete at this time.     All questions and concerns were addressed during encounter.   -------------------------------------------------------------------------------------------    Medication History   No current outpatient medications  Scheduled medications   cefTRIAXone, 1 g, intravenous, q24h  enoxaparin, 30 mg, subcutaneous, q24h  polyethylene glycol, 17 g, oral, Daily      PRN medications  PRN medications: haloperidol lactate, QUEtiapine     Last Recorded Vitals  Blood pressure 164/78, pulse 86, temperature 35.7 °C (96.3 °F), temperature source Temporal, resp. rate 18, height 1.499 m (4' 11\"), weight 47.7 kg (105 lb 3.2 oz), SpO2 96%.  7 Day Weight Change: Unable to Calculate   Body mass index is 21.25 kg/m².     Relevant Results  Lab Results   Component Value Date    WBC 8.1 10/16/2024    HGB 11.9 (L) 10/16/2024    HCT 37.1 10/16/2024     " 10/16/2024     10/16/2024      Lab Results   Component Value Date    GLUCOSE 84 10/16/2024    CALCIUM 8.8 10/16/2024     10/16/2024    K 4.0 10/16/2024    CO2 28 10/16/2024     10/16/2024    BUN 23 10/16/2024    CREATININE 0.92 10/16/2024      Lab Results   Component Value Date    ALT 19 10/16/2024    AST 40 (H) 10/16/2024    ALKPHOS 60 10/16/2024    BILITOT 0.6 10/16/2024      Lab Results   Component Value Date    ALBUMIN 3.7 10/16/2024     Serum creatinine: 0.92 mg/dL 10/16/24 0624  Estimated creatinine clearance: 26.2 mL/min     Relevant Imaging  ECG 12 lead  Normal sinus rhythm  Normal ECG  When compared with ECG of 09-AUG-2023 09:57,  Previous ECG has undetermined rhythm, needs review  QRS axis Shifted left     Diagnostics Review With Patient or Family  Yes    Physical Exam:   Physical Exam  Constitutional:       General: She is not in acute distress.     Comments: Sitting up in bed, had just walked with walker in room with PT, PT assisting patient in eating her dinner   HENT:      Head: Normocephalic and atraumatic.      Mouth/Throat:      Mouth: Mucous membranes are moist.   Eyes:      Conjunctiva/sclera: Conjunctivae normal.   Pulmonary:      Effort: Pulmonary effort is normal.   Neurological:      Mental Status: She is alert. Mental status is at baseline. She is disoriented.      Comments: AxOx0 at baseline per chart, patient did not answer any of author's questions during encounter         ASSESSMENT AND PLAN    - Continue supportive care through primary team    #Goals of Care:  #Complex Medical Decision Making:  #Advance Care Planning:  - goals are now comfort and quality of life based: patient's daughter would like for patient to discharge home with hospice services      -- Adrienne unsure of which hospice she and family would like to utilize and plans to call author later today or tomorrow morning so author can place the referral to the hospice of her choosing (aware they need to go to  [place])       -- Adrienne in agreement to not hold up patient's discharge to meet with hospice here because she does not want patient to be in the hospital for longer than she needs to be and has supplies/equipment already in place at home to meet patient's needs: tentative plan for patient to discharge home tomorrow morning and meet with hospice at home after      - code status DNR/DNI   - state DNR form completed and placed in patient's chart     Supportive Interventions: Music Therapy: referral placed     Plan of Care discussed with: Updated Dr. Meza, SW, TCC, and bedside RN on goals of care discussion and decision to pursue hospice via [email or web address].     Thank you for asking Palliative Care to assist with care of this patient.  We will continue to follow  Please contact us for additional questions or concerns.    Signature and billing  Medical complexity was high level due to complexity of problems including end stage dementia posing threat to life or function, extensive data review, interviewing patient/family, discussion with primary team and bedside RN, coordination of care, and high risk of management/treatment including decision for patient to descalate care and discharge home with hospice services in place. I spent an additional 30 minutes in advance care planning.     SIGNATURE: MELI Sommers-CNP  PAGER/CONTACT:  Contact information:  Palliative Medicine  Contact Via Epic Secure chat

## 2024-10-16 NOTE — CONSULTS
"Nutrition Initial Assessment:   Nutrition Assessment    Reason for Assessment: Admission nursing screening    Patient is a 89 y.o. female with h/o dementia, now presenting for metabolic encephalopathy 2/2 UTI vs vitamin deficiency.     Palliative care consult pending for [facility] discussion.     Nutrition History:  Energy Intake: Poor < 50 %  Food and Nutrient History: Pt resting in bed at time of visit. Did not open eyes to name being called. Had just finished being fed lunch by nursing. Noted to only have taken bites off tray. Unable to obtain additional nutritional history at this time.  Food Allergies/Intolerances:  None    Anthropometrics:  Height: 149.9 cm (4' 11\")   Weight: 47.7 kg (105 lb 3.2 oz)   BMI (Calculated): 21.24  IBW/kg (Dietitian Calculated): 45.5 kg  Percent of IBW: 105 %     Weight History:   Wt Readings from Last 30 Encounters:   10/16/24 47.7 kg (105 lb 3.2 oz) --> Admit wt     Weight Change %:  Weight History / % Weight Change: No hx weights in EMR at time of assessment. Unable to accurately assess for weight changes at this time.    Nutrition Focused Physical Exam Findings:  Subcutaneous Fat Loss:   Orbital Fat Pads: Severe (dark circles, hollowing and loose skin)  Buccal Fat Pads: Severe (hollow, sunken and narrow face)  Muscle Wasting:  Temporalis: Severe (hollowed scooping depression)  Pectoralis (Clavicular Region): Severe (protruding prominent clavicle)  Quadriceps: Severe (depressions on inner and outer thigh)  Gastrocnemius: Severe (minimal muscle definition)  Edema:  Edema: none  Physical Findings:  Skin:  (Intact)    Nutrition Significant Labs:  BMP Trend:   Results from last 7 days   Lab Units 10/16/24  0624 10/15/24  1829   GLUCOSE mg/dL 84 95   CALCIUM mg/dL 8.8 8.8   SODIUM mmol/L 141 141   POTASSIUM mmol/L 4.0 4.0   CO2 mmol/L 28 27   CHLORIDE mmol/L 105 103   BUN mg/dL 23 24*   CREATININE mg/dL 0.92 0.84      Nutrition Specific Medications:  Scheduled medications  cefTRIAXone, 1 g, " intravenous, q24h  polyethylene glycol, 17 g, oral, Daily    I/O:   No recorded BM since admission.    Dietary Orders (From admission, onward)       Start     Ordered    10/16/24 1019  Adult diet Regular; Soft and bite sized 6  Diet effective now        Question Answer Comment   Diet type Regular    Texture Soft and bite sized 6        10/16/24 1019    10/16/24 0231  May Participate in Room Service With Assistance  ( ROOM SERVICE MAY PARTICIPATE WITH ASSISTANCE)  Once        Question:  .  Answer:  Yes    10/16/24 0230             Estimated Needs:   Total Energy Estimated Needs (kCal):  (9792-0878)  Method for Estimating Needs: 28-30 kcals/kg x admit wt (47.7 kg)  Total Protein Estimated Needs (g):  (45-55)  Method for Estimating Needs: 1.0-1.2 g/kg x admit wt (47.7 kg)  Total Fluid Estimated Needs (mL):  (6612-8719)  Method for Estimating Needs: 1mL/kcal or per team        Nutrition Diagnosis   Malnutrition Diagnosis  Patient has Malnutrition Diagnosis: Yes  Diagnosis Status: New  Malnutrition Diagnosis: Severe malnutrition related to chronic disease or condition  As Evidenced by: suspected <75% EENs for >1 month with severe muscle wasting/fat loss      Nutrition Interventions/Recommendations         Nutrition Prescription:  Continue soft & bite sized diet as tolerated  If pt with s/sx of aspiration, recommend speech therapy consult for formal bedside swallowing evaluation        Nutrition Interventions:   Interventions: Medical food supplement  Medical Food Supplement: Commercial beverage  Goal: Ensure Plus HP BID (350 kcals/20g protein each)    Nutrition Monitoring and Evaluation   Food/Nutrient Related History Monitoring  Monitoring and Evaluation Plan: Energy intake  Energy Intake: Estimated energy intake  Criteria: Meet >50% EENs    Time Spent (min): 60 minutes

## 2024-10-16 NOTE — SIGNIFICANT EVENT
Resident Staffing Note  S: 89F Hx moderate/severe dementia with psychotic features for the last 6 months presents with worsening encephalopathy and generalized weakness. Family are primary caregivers, chart indicates dementia worsened precipitously 18 months ago, however this does not align with family report of several years.    O: UA with leukocyte esterase 75 6-10 WBC, CT showed possible atelectasis, no cystitis or pyelonephritis noted on CT. Other labs WNL. Vitals show tachycardia at 98 and hypertension at 157/82.     A/P: 89F with a history of dementia with 6 month history of worsening symptoms. Differential includes progression of dementia and metabolic encephalopathy 2/2 acute infection vs vitamin deficiency vs dehydration, favor metabolic encephalopathy.  - Palliative care  - Ceftriaxone 1g q24hr  - Encourage PO fluid intake  - B12 levels

## 2024-10-17 VITALS
TEMPERATURE: 97.9 F | HEART RATE: 111 BPM | HEIGHT: 59 IN | SYSTOLIC BLOOD PRESSURE: 150 MMHG | RESPIRATION RATE: 20 BRPM | BODY MASS INDEX: 21.21 KG/M2 | OXYGEN SATURATION: 98 % | WEIGHT: 105.2 LBS | DIASTOLIC BLOOD PRESSURE: 87 MMHG

## 2024-10-17 PROBLEM — G93.41 ACUTE METABOLIC ENCEPHALOPATHY: Status: RESOLVED | Noted: 2024-10-15 | Resolved: 2024-10-17

## 2024-10-17 LAB
ALBUMIN SERPL BCP-MCNC: 3.8 G/DL (ref 3.4–5)
ALP SERPL-CCNC: 59 U/L (ref 33–136)
ALT SERPL W P-5'-P-CCNC: 21 U/L (ref 7–45)
ANION GAP SERPL CALC-SCNC: 16 MMOL/L (ref 10–20)
AST SERPL W P-5'-P-CCNC: 44 U/L (ref 9–39)
BACTERIA UR CULT: ABNORMAL
BILIRUB SERPL-MCNC: 0.5 MG/DL (ref 0–1.2)
BUN SERPL-MCNC: 23 MG/DL (ref 6–23)
CALCIUM SERPL-MCNC: 8.8 MG/DL (ref 8.6–10.3)
CHLORIDE SERPL-SCNC: 106 MMOL/L (ref 98–107)
CO2 SERPL-SCNC: 22 MMOL/L (ref 21–32)
CREAT SERPL-MCNC: 0.82 MG/DL (ref 0.5–1.05)
EGFRCR SERPLBLD CKD-EPI 2021: 67 ML/MIN/1.73M*2
ERYTHROCYTE [DISTWIDTH] IN BLOOD BY AUTOMATED COUNT: 12.8 % (ref 11.5–14.5)
GLUCOSE SERPL-MCNC: 83 MG/DL (ref 74–99)
HCT VFR BLD AUTO: 42.2 % (ref 36–46)
HGB BLD-MCNC: 12.6 G/DL (ref 12–16)
MCH RBC QN AUTO: 31.9 PG (ref 26–34)
MCHC RBC AUTO-ENTMCNC: 29.9 G/DL (ref 32–36)
MCV RBC AUTO: 107 FL (ref 80–100)
NRBC BLD-RTO: 0 /100 WBCS (ref 0–0)
PLATELET # BLD AUTO: 172 X10*3/UL (ref 150–450)
POTASSIUM SERPL-SCNC: 4 MMOL/L (ref 3.5–5.3)
PROT SERPL-MCNC: 6.3 G/DL (ref 6.4–8.2)
RBC # BLD AUTO: 3.95 X10*6/UL (ref 4–5.2)
SODIUM SERPL-SCNC: 140 MMOL/L (ref 136–145)
WBC # BLD AUTO: 5.5 X10*3/UL (ref 4.4–11.3)

## 2024-10-17 PROCEDURE — 94760 N-INVAS EAR/PLS OXIMETRY 1: CPT

## 2024-10-17 PROCEDURE — G0378 HOSPITAL OBSERVATION PER HR: HCPCS

## 2024-10-17 PROCEDURE — 96372 THER/PROPH/DIAG INJ SC/IM: CPT

## 2024-10-17 PROCEDURE — 36415 COLL VENOUS BLD VENIPUNCTURE: CPT

## 2024-10-17 PROCEDURE — 85027 COMPLETE CBC AUTOMATED: CPT

## 2024-10-17 PROCEDURE — 99238 HOSP IP/OBS DSCHRG MGMT 30/<: CPT

## 2024-10-17 PROCEDURE — 80053 COMPREHEN METABOLIC PANEL: CPT

## 2024-10-17 PROCEDURE — 2500000004 HC RX 250 GENERAL PHARMACY W/ HCPCS (ALT 636 FOR OP/ED)

## 2024-10-17 RX ADMIN — ENOXAPARIN SODIUM 30 MG: 30 INJECTION SUBCUTANEOUS at 06:12

## 2024-10-17 RX ADMIN — POLYETHYLENE GLYCOL 3350 17 G: 17 POWDER, FOR SOLUTION ORAL at 09:22

## 2024-10-17 ASSESSMENT — COGNITIVE AND FUNCTIONAL STATUS - GENERAL
MOVING TO AND FROM BED TO CHAIR: A LITTLE
DRESSING REGULAR UPPER BODY CLOTHING: A LITTLE
STANDING UP FROM CHAIR USING ARMS: A LITTLE
HELP NEEDED FOR BATHING: A LOT
MOBILITY SCORE: 17
EATING MEALS: A LITTLE
DRESSING REGULAR LOWER BODY CLOTHING: A LITTLE
DAILY ACTIVITIY SCORE: 14
PERSONAL GROOMING: A LOT
TURNING FROM BACK TO SIDE WHILE IN FLAT BAD: A LITTLE
CLIMB 3 TO 5 STEPS WITH RAILING: A LOT
TOILETING: TOTAL
MOVING FROM LYING ON BACK TO SITTING ON SIDE OF FLAT BED WITH BEDRAILS: A LITTLE
WALKING IN HOSPITAL ROOM: A LITTLE

## 2024-10-17 ASSESSMENT — PAIN SCALES - WONG BAKER: WONGBAKER_NUMERICALRESPONSE: NO HURT

## 2024-10-17 NOTE — CARE PLAN
Palliative Care Social Work Note    Update from team, pt is discharging today.    Family wants to meet with hospice post dc.     Pt referred to [facility] via [facility].  See palliative care NP note for details.      Updated pt's dtr Adrienne.

## 2024-10-17 NOTE — PROGRESS NOTES
10/17/24 1000   Discharge Planning   Expected Discharge Disposition Shine  (DC today to home with new hospice [facility]. Daughter will be here around 12p to transport patient home.)

## 2024-10-17 NOTE — DISCHARGE SUMMARY
Discharge Diagnosis  Worsening Dementia  Encephalopathy  Possible UTI  Goals of care encounter    Issues Requiring Follow-Up  Further management by hospice services and PCP outpatient    Discharge Meds     Medication List      You have not been prescribed any medications.       Test Results Pending At Discharge  Pending Labs       No current pending labs.            Hospital Course  Melida Greene is a 89 y.o. female with pmh of dementia, unspecified type, presented to the hospital for altered mental status and increased lethargy.     ED COURSE:   Vitals - /80 (BP Location: Right arm, Patient Position: Lying)   Pulse 104   Temp 36.1 °C (97 °F) (Temporal)   Resp 22   Wt 47.7 kg (105 lb 3.2 oz)   SpO2 99%     Labs: WNL    Imaging:   CT Cervical spine: No fracture, moderate dgenerative changes  CT Thoracic spine: fracture along lateral left 7th rib  Ground glass opacities in bilateral lower lobes  CT Lumbar: Osseous demineralization. No fracture. Moderate spondylolysis  CTAP: no acute trauma.  CXR: No acute cardiopulmonary diseae  XR Shoulder: No bony abnormalities    Interventions   mL bolus  Rocephin 1g    Floor Course:  Rocephin was continued. Pt was at baseline according to daughter. Goals of care discussion with assistance of palliative care team. Plan discharging home with hospice today (10/17/24). DC antibiotics.    Pertinent Physical Exam At Time of Discharge  Physical Exam  Constitutional:       Appearance: Normal Appearance     Comments: A&Ox0   HENT:      Head:      Comments: Temporal Wasting     Mouth/Throat:      Mouth: Mucous membranes are dry.   Cardiovascular:      Rate and Rhythm: Normal rate and regular rhythm.   Pulmonary:      Effort: Pulmonary effort is normal.      Breath sounds: Normal breath sounds.   Abdominal:      General: Abdomen is flat. Bowel sounds are normal.      Palpations: Abdomen is soft.   Skin:     General: Skin is dry.   Neurological:      Mental Status: She is  disoriented.   Psychiatric:         Mood and Affect: Mood normal.      Comments: Incoherent     Outpatient Follow-Up  No future appointments.      Akhil Meza DO